# Patient Record
Sex: MALE | Race: WHITE | NOT HISPANIC OR LATINO | Employment: FULL TIME | URBAN - METROPOLITAN AREA
[De-identification: names, ages, dates, MRNs, and addresses within clinical notes are randomized per-mention and may not be internally consistent; named-entity substitution may affect disease eponyms.]

---

## 2023-01-24 PROBLEM — E03.9 HYPOTHYROIDISM: Status: ACTIVE | Noted: 2023-01-24

## 2023-01-24 PROBLEM — I10 BENIGN ESSENTIAL HYPERTENSION: Status: ACTIVE | Noted: 2023-01-24

## 2023-01-24 PROBLEM — E78.5 HYPERLIPIDEMIA: Status: ACTIVE | Noted: 2023-01-24

## 2023-01-24 PROBLEM — N18.30 CKD STAGE 3 SECONDARY TO DIABETES (MULTI): Status: ACTIVE | Noted: 2023-01-24

## 2023-01-24 PROBLEM — M10.9 GOUT: Status: ACTIVE | Noted: 2023-01-24

## 2023-01-24 PROBLEM — E11.9 DIABETES TYPE 2, CONTROLLED (MULTI): Status: ACTIVE | Noted: 2023-01-24

## 2023-01-24 PROBLEM — E11.22 CKD STAGE 3 SECONDARY TO DIABETES (MULTI): Status: ACTIVE | Noted: 2023-01-24

## 2023-01-24 RX ORDER — CHOLECALCIFEROL (VITAMIN D3) 25 MCG
1 TABLET ORAL EVERY OTHER DAY
COMMUNITY

## 2023-01-24 RX ORDER — LOSARTAN POTASSIUM 25 MG/1
1 TABLET ORAL DAILY
COMMUNITY
Start: 2020-03-23

## 2023-01-24 RX ORDER — FEBUXOSTAT 80 MG/1
1 TABLET, FILM COATED ORAL DAILY
COMMUNITY
End: 2023-03-08 | Stop reason: SDUPTHER

## 2023-01-24 RX ORDER — ASPIRIN 81 MG/1
1 TABLET ORAL DAILY
COMMUNITY

## 2023-01-24 RX ORDER — CHLORTHALIDONE 25 MG/1
1 TABLET ORAL DAILY
COMMUNITY
Start: 2021-11-29 | End: 2023-11-13 | Stop reason: ALTCHOICE

## 2023-01-24 RX ORDER — INSULIN LISPRO 100 [IU]/ML
INJECTION, SUSPENSION SUBCUTANEOUS 2 TIMES DAILY
COMMUNITY
Start: 2020-04-17

## 2023-01-24 RX ORDER — ROSUVASTATIN CALCIUM 20 MG/1
1 TABLET, COATED ORAL DAILY
COMMUNITY
Start: 2018-10-15 | End: 2024-04-24

## 2023-03-08 ENCOUNTER — OFFICE VISIT (OUTPATIENT)
Dept: PRIMARY CARE | Facility: CLINIC | Age: 63
End: 2023-03-08
Payer: COMMERCIAL

## 2023-03-08 VITALS
HEART RATE: 50 BPM | DIASTOLIC BLOOD PRESSURE: 78 MMHG | HEIGHT: 63 IN | BODY MASS INDEX: 28.53 KG/M2 | TEMPERATURE: 97.7 F | WEIGHT: 161 LBS | RESPIRATION RATE: 16 BRPM | SYSTOLIC BLOOD PRESSURE: 118 MMHG

## 2023-03-08 DIAGNOSIS — M10.9 GOUT OF MULTIPLE SITES, UNSPECIFIED CAUSE, UNSPECIFIED CHRONICITY: ICD-10-CM

## 2023-03-08 PROCEDURE — 4010F ACE/ARB THERAPY RXD/TAKEN: CPT | Performed by: FAMILY MEDICINE

## 2023-03-08 PROCEDURE — 99213 OFFICE O/P EST LOW 20 MIN: CPT | Performed by: FAMILY MEDICINE

## 2023-03-08 PROCEDURE — 3066F NEPHROPATHY DOC TX: CPT | Performed by: FAMILY MEDICINE

## 2023-03-08 PROCEDURE — 1036F TOBACCO NON-USER: CPT | Performed by: FAMILY MEDICINE

## 2023-03-08 PROCEDURE — 3044F HG A1C LEVEL LT 7.0%: CPT | Performed by: FAMILY MEDICINE

## 2023-03-08 PROCEDURE — 3078F DIAST BP <80 MM HG: CPT | Performed by: FAMILY MEDICINE

## 2023-03-08 PROCEDURE — 3074F SYST BP LT 130 MM HG: CPT | Performed by: FAMILY MEDICINE

## 2023-03-08 RX ORDER — FEBUXOSTAT 80 MG/1
80 TABLET, FILM COATED ORAL DAILY
Qty: 90 TABLET | Refills: 1 | Status: SHIPPED | OUTPATIENT
Start: 2023-03-08 | End: 2023-09-11 | Stop reason: SDUPTHER

## 2023-03-08 NOTE — PROGRESS NOTES
Subjective   Patient ID: Gurpreet Zimmer is a 62 y.o. male who presents for Gout.    HPI   Gout recheck -- Patient reports that gout has been stable. There have been no significant flares since last visit. Patient is tolerating medications well with no side effects. Patient denies joint redness, swelling, pain, or nodules.  He has not had a flare of gout in 4 years.  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  His most recent uric acid level was 2 months ago and it was 3.5.    Review of Systems    Objective   There were no vitals taken for this visit.    Physical Exam  General - Not in acute distress and cooperative.  Build & Nutrition - Well developed  Posture - Normal  Gait - Normal  Mental Status - alert and oriented x 3    Head - Normocephalic    Eyes - Bilateral - Sclera clear and lids pink without edema or mass.      Skin - Warm and dry with no rashes on visible skin    Neuropsychiatric - normal mood and affect, well groomed and good eye contact.  Able to articulate well with normal speech/language, rate and coherence.  Associations are intact.  No evidence of hallucinations, delusions, obsessions or homicidal/suicidal ideation.  Attention span and ability to concentrate are normal.    Hands-patient does have some tophi over some of the joints of his fingers.  None of them are tender and he says they have improved greatly over the last 5 years.  Assessment/Plan   Diagnoses and all orders for this visit:  Gout of multiple sites, unspecified cause, unspecified chronicity  -     febuxostat (Uloric) 80 mg tablet; Take 1 tablet (80 mg) by mouth once daily.   Patient's gout is very well controlled and his recent uric acid level was 3.5.  No change in current treatment regimen.  Refill given of current medication.  Make a follow up appointment with me for recheck in 6 months.

## 2023-06-28 LAB
ALANINE AMINOTRANSFERASE (SGPT) (U/L) IN SER/PLAS: 18 U/L (ref 10–52)
ALBUMIN (G/DL) IN SER/PLAS: 4.4 G/DL (ref 3.4–5)
ALKALINE PHOSPHATASE (U/L) IN SER/PLAS: 64 U/L (ref 33–136)
ANION GAP IN SER/PLAS: 12 MMOL/L (ref 10–20)
ASPARTATE AMINOTRANSFERASE (SGOT) (U/L) IN SER/PLAS: 20 U/L (ref 9–39)
BILIRUBIN TOTAL (MG/DL) IN SER/PLAS: 0.4 MG/DL (ref 0–1.2)
CALCIUM (MG/DL) IN SER/PLAS: 9.4 MG/DL (ref 8.6–10.3)
CARBON DIOXIDE, TOTAL (MMOL/L) IN SER/PLAS: 21 MMOL/L (ref 21–32)
CHLORIDE (MMOL/L) IN SER/PLAS: 114 MMOL/L (ref 98–107)
CREATININE (MG/DL) IN SER/PLAS: 2.32 MG/DL (ref 0.5–1.3)
ESTIMATED AVERAGE GLUCOSE FOR HBA1C: 148 MG/DL
GFR MALE: 31 ML/MIN/1.73M2
GLUCOSE (MG/DL) IN SER/PLAS: 194 MG/DL (ref 74–99)
HEMOGLOBIN A1C/HEMOGLOBIN TOTAL IN BLOOD: 6.8 %
PHOSPHATE (MG/DL) IN SER/PLAS: 4 MG/DL (ref 2.5–4.9)
POTASSIUM (MMOL/L) IN SER/PLAS: 5.2 MMOL/L (ref 3.5–5.3)
PROTEIN TOTAL: 7.1 G/DL (ref 6.4–8.2)
SODIUM (MMOL/L) IN SER/PLAS: 142 MMOL/L (ref 136–145)
UREA NITROGEN (MG/DL) IN SER/PLAS: 71 MG/DL (ref 6–23)

## 2023-09-11 ENCOUNTER — OFFICE VISIT (OUTPATIENT)
Dept: PRIMARY CARE | Facility: CLINIC | Age: 63
End: 2023-09-11
Payer: COMMERCIAL

## 2023-09-11 VITALS
WEIGHT: 158 LBS | HEIGHT: 63 IN | TEMPERATURE: 97 F | BODY MASS INDEX: 28 KG/M2 | HEART RATE: 48 BPM | RESPIRATION RATE: 16 BRPM | DIASTOLIC BLOOD PRESSURE: 72 MMHG | SYSTOLIC BLOOD PRESSURE: 142 MMHG

## 2023-09-11 DIAGNOSIS — M1A.09X0 CHRONIC GOUT OF MULTIPLE SITES, UNSPECIFIED CAUSE: Primary | ICD-10-CM

## 2023-09-11 PROCEDURE — 3066F NEPHROPATHY DOC TX: CPT | Performed by: FAMILY MEDICINE

## 2023-09-11 PROCEDURE — 3078F DIAST BP <80 MM HG: CPT | Performed by: FAMILY MEDICINE

## 2023-09-11 PROCEDURE — 3044F HG A1C LEVEL LT 7.0%: CPT | Performed by: FAMILY MEDICINE

## 2023-09-11 PROCEDURE — 99213 OFFICE O/P EST LOW 20 MIN: CPT | Performed by: FAMILY MEDICINE

## 2023-09-11 PROCEDURE — 1036F TOBACCO NON-USER: CPT | Performed by: FAMILY MEDICINE

## 2023-09-11 PROCEDURE — 3077F SYST BP >= 140 MM HG: CPT | Performed by: FAMILY MEDICINE

## 2023-09-11 PROCEDURE — 90471 IMMUNIZATION ADMIN: CPT | Performed by: FAMILY MEDICINE

## 2023-09-11 PROCEDURE — 90686 IIV4 VACC NO PRSV 0.5 ML IM: CPT | Performed by: FAMILY MEDICINE

## 2023-09-11 PROCEDURE — 4010F ACE/ARB THERAPY RXD/TAKEN: CPT | Performed by: FAMILY MEDICINE

## 2023-09-11 RX ORDER — DAPAGLIFLOZIN 5 MG/1
TABLET, FILM COATED ORAL
COMMUNITY
End: 2023-11-13 | Stop reason: ALTCHOICE

## 2023-09-11 RX ORDER — FEBUXOSTAT 80 MG/1
80 TABLET, FILM COATED ORAL DAILY
Qty: 90 TABLET | Refills: 1 | Status: SHIPPED | OUTPATIENT
Start: 2023-09-11 | End: 2024-03-14 | Stop reason: SDUPTHER

## 2023-09-11 ASSESSMENT — ENCOUNTER SYMPTOMS
OCCASIONAL FEELINGS OF UNSTEADINESS: 0
DEPRESSION: 0
LOSS OF SENSATION IN FEET: 0

## 2023-09-11 NOTE — PROGRESS NOTES
"Gurpreet Zimmer is a 62 y.o. male here today for   Chief Complaint   Patient presents with    Gout   Pt sees Lukas for DM      HPI   Gout recheck -- Patient reports that gout has been stable. There have been no significant flares since last visit. Patient is tolerating medications well with no side effects. Patient denies joint redness, swelling, pain, or nodules.   Minor gout pain in left first toe just this AM.  No redness - just very minor pain.      DM2 - well controlled with endocrinologist.  [place]      Would like a flu shot.          Current Outpatient Medications:     aspirin 81 mg EC tablet, Take 1 tablet (81 mg) by mouth once daily., Disp: , Rfl:     chlorthalidone (Hygroton) 25 mg tablet, Take 1 tablet (25 mg) by mouth once daily., Disp: , Rfl:     cholecalciferol (Vitamin D-3) 25 MCG (1000 UT) tablet, Take 1 tablet (25 mcg) by mouth every other day., Disp: , Rfl:     dapagliflozin propanediol (Farxiga) 5 mg, Take by mouth. Pt unsure of dose, Disp: , Rfl:     insulin lispro protamin-lispro (HumaLOG Mix 75-25) 100 unit/mL (75-25) injection, Take as directed per insulin instructions. Inject 6-8 units twice a day, Disp: , Rfl:     losartan (Cozaar) 25 mg tablet, Take 1 tablet (25 mg) by mouth once daily., Disp: , Rfl:     rosuvastatin (Crestor) 20 mg tablet, Take 1 tablet (20 mg) by mouth once daily., Disp: , Rfl:     febuxostat (Uloric) 80 mg tablet, Take 1 tablet (80 mg) by mouth once daily., Disp: 90 tablet, Rfl: 1    Patient Active Problem List   Diagnosis    Benign essential hypertension    CKD stage 3 secondary to diabetes (CMS/[facility])    Diabetes type 2, controlled (CMS/[facility])    Gout    Hyperlipidemia    Hypothyroidism         No results found for this or any previous visit (from the past 672 hour(s)).     Objective    Visit Vitals  /72   Pulse (!) 48   Temp 36.1 °C (97 °F)   Resp 16   Ht 1.6 m (5' 3\")   Wt 71.7 kg (158 lb)   BMI 27.99 kg/m²     Body mass index is 27.99 kg/m². "     Physical Exam   General - Not in acute distress and cooperative.  Build & Nutrition - Well developed  Posture - Normal  Gait - Normal  Mental Status - alert and oriented x 3    Head - Normocephalic    Neck - Thyroid normal size    Eyes - Bilateral - Sclera clear and lids pink without edema or mass.      Skin - Warm and dry with no rashes on visible skin    Lungs - Clear to auscultation and normal breathing effort    Cardiovascular - RRR and no murmurs, rubs or thrill.    Peripheral Vascular - Bilateral - no edema present    Neuropsychiatric - normal mood and affect    Right foot-there is very mild erythema and swelling of the right first MP joint today.  There is no tenderness.    Assessment    1. Chronic gout of multiple sites, unspecified cause  Uric Acid, febuxostat (Uloric) 80 mg tablet   Condition well controlled.  No change in current treatment regimen.  Refill given of current medication.  Appropriate labs ordered or reviewed.  Make a follow up appointment with me for recheck in 6 months.  He has a very minor current flare as above.  This is his first symptoms in over 3 years.  He plans to take Aleve twice daily for the next 1 to 2 days because this usually works very well for gout for him.  If the gout is not improving he can call us for further advice.

## 2023-11-13 ENCOUNTER — OFFICE VISIT (OUTPATIENT)
Dept: NEPHROLOGY | Facility: CLINIC | Age: 63
End: 2023-11-13
Payer: COMMERCIAL

## 2023-11-13 ENCOUNTER — OFFICE VISIT (OUTPATIENT)
Dept: ENDOCRINOLOGY | Facility: CLINIC | Age: 63
End: 2023-11-13
Payer: COMMERCIAL

## 2023-11-13 VITALS
HEIGHT: 63 IN | DIASTOLIC BLOOD PRESSURE: 62 MMHG | HEART RATE: 56 BPM | WEIGHT: 162 LBS | BODY MASS INDEX: 28.7 KG/M2 | SYSTOLIC BLOOD PRESSURE: 131 MMHG

## 2023-11-13 VITALS
HEIGHT: 63 IN | BODY MASS INDEX: 28.67 KG/M2 | HEART RATE: 72 BPM | RESPIRATION RATE: 16 BRPM | WEIGHT: 161.8 LBS | DIASTOLIC BLOOD PRESSURE: 70 MMHG | SYSTOLIC BLOOD PRESSURE: 122 MMHG

## 2023-11-13 DIAGNOSIS — E78.5 HYPERLIPIDEMIA, UNSPECIFIED HYPERLIPIDEMIA TYPE: ICD-10-CM

## 2023-11-13 DIAGNOSIS — E11.22 CONTROLLED TYPE 2 DIABETES MELLITUS WITH STAGE 3 CHRONIC KIDNEY DISEASE, WITHOUT LONG-TERM CURRENT USE OF INSULIN (MULTI): ICD-10-CM

## 2023-11-13 DIAGNOSIS — Z79.4 CONTROLLED TYPE 2 DIABETES MELLITUS WITHOUT COMPLICATION, WITH LONG-TERM CURRENT USE OF INSULIN (MULTI): Primary | ICD-10-CM

## 2023-11-13 DIAGNOSIS — E11.22 CKD STAGE 3 SECONDARY TO DIABETES (MULTI): Primary | ICD-10-CM

## 2023-11-13 DIAGNOSIS — E11.9 CONTROLLED TYPE 2 DIABETES MELLITUS WITHOUT COMPLICATION, WITH LONG-TERM CURRENT USE OF INSULIN (MULTI): Primary | ICD-10-CM

## 2023-11-13 DIAGNOSIS — N18.30 CKD STAGE 3 SECONDARY TO DIABETES (MULTI): Primary | ICD-10-CM

## 2023-11-13 DIAGNOSIS — I10 BENIGN ESSENTIAL HYPERTENSION: ICD-10-CM

## 2023-11-13 DIAGNOSIS — N18.30 CONTROLLED TYPE 2 DIABETES MELLITUS WITH STAGE 3 CHRONIC KIDNEY DISEASE, WITHOUT LONG-TERM CURRENT USE OF INSULIN (MULTI): ICD-10-CM

## 2023-11-13 PROCEDURE — 3074F SYST BP LT 130 MM HG: CPT | Performed by: INTERNAL MEDICINE

## 2023-11-13 PROCEDURE — 4010F ACE/ARB THERAPY RXD/TAKEN: CPT | Performed by: INTERNAL MEDICINE

## 2023-11-13 PROCEDURE — 99213 OFFICE O/P EST LOW 20 MIN: CPT | Performed by: INTERNAL MEDICINE

## 2023-11-13 PROCEDURE — 3066F NEPHROPATHY DOC TX: CPT | Performed by: INTERNAL MEDICINE

## 2023-11-13 PROCEDURE — 99214 OFFICE O/P EST MOD 30 MIN: CPT | Performed by: INTERNAL MEDICINE

## 2023-11-13 PROCEDURE — 3078F DIAST BP <80 MM HG: CPT | Performed by: INTERNAL MEDICINE

## 2023-11-13 PROCEDURE — 3044F HG A1C LEVEL LT 7.0%: CPT | Performed by: INTERNAL MEDICINE

## 2023-11-13 PROCEDURE — 1036F TOBACCO NON-USER: CPT | Performed by: INTERNAL MEDICINE

## 2023-11-13 PROCEDURE — 3075F SYST BP GE 130 - 139MM HG: CPT | Performed by: INTERNAL MEDICINE

## 2023-11-13 ASSESSMENT — ENCOUNTER SYMPTOMS
DIZZINESS: 0
FEVER: 0
DIARRHEA: 0
SHORTNESS OF BREATH: 0
NAUSEA: 0
VOMITING: 0
CHILLS: 0
LIGHT-HEADEDNESS: 0

## 2023-11-13 ASSESSMENT — PAIN SCALES - GENERAL: PAINLEVEL: 0-NO PAIN

## 2023-11-13 NOTE — PROGRESS NOTES
"Subjective   Patient ID: Gurpreet Zimmer is a 63 y.o. male who presents for Diabetes (Type 2) and Hyperlipidemia.  HPI  Since last visit started farxgia after discussing it with nephro.  Doing fine on it. Reports sugars are good. Rare lows.  Still has not scheduled eye exam, forgot.  No complaints but would like to lose a little weight.  Knows he needs to work on exercise    Review of Systems   Constitutional:  Negative for chills and fever.   Respiratory:  Negative for shortness of breath.    Gastrointestinal:  Negative for diarrhea, nausea and vomiting.   Endocrine: Negative for cold intolerance and heat intolerance.   Neurological:  Negative for dizziness and light-headedness.       Objective       11/13/2023 8:32 AM    /70   Pulse 72   Resp 16   Weight 73.4 kg (161 lb 12.8 oz)   Height 1.6 m (5' 3\")       Physical Exam  Constitutional:       Appearance: Normal appearance. He is overweight.   HENT:      Head: Normocephalic and atraumatic.   Neck:      Thyroid: No thyroid mass, thyromegaly or thyroid tenderness.   Cardiovascular:      Rate and Rhythm: Normal rate and regular rhythm.      Heart sounds: No murmur heard.     No gallop.   Pulmonary:      Effort: Pulmonary effort is normal.      Breath sounds: Normal breath sounds.   Abdominal:      Palpations: Abdomen is soft.      Comments: benign   Neurological:      General: No focal deficit present.      Mental Status: He is alert and oriented to person, place, and time.      Deep Tendon Reflexes: Reflexes are normal and symmetric.   Psychiatric:         Behavior: Behavior is cooperative.         Assessment/Plan   Problem List Items Addressed This Visit             ICD-10-CM    Benign essential hypertension I10    Diabetes type 2, controlled (CMS/HCC) - Primary E11.9    Relevant Orders    Comprehensive Metabolic Panel    CBC    Lipid Panel    Hemoglobin A1C    Albumin , Urine Random    Hyperlipidemia E78.5     DM2:  Blood work fasting in near future  Schedule " eye appt  Htn:  Bp excellent  Lipids:  Due for recheck  Follow up in 6 months

## 2023-11-13 NOTE — PROGRESS NOTES
[facility] Progress Note    Subjective   Today [facility] had a [facility]. He is a pleasant 63-year-old male who presents for f/u of his chronic kidney disease follow-up. He was last seen in our clinic 6 months ago in May 2023.     Past medical history: Uncontrolled hypertension, chronic kidney disease stage III, diabetes type 2, gout  His father was on dialysis for few years before he passed away. He still works as a      From Nov 2022  - no acute complaints. BP an HTN well controlled. Staying active. Occasional left leg swelling but normal for him. Trying to eat better.     From May 2023:  No acute complaints. BP and HTN remain well controlled. Staying healthy.     Today's visit:  No acute complaints. Began farxiga after last visit, tolerating it well. BP and blood glucose under control.     Review of Systems     Constitutional: no fever,~no chills,~no recent weight gain,~no recent weight loss~and~not feeling tired.   Cardiovascular: no chest pain~and~no lower extremity edema.   Respiratory: shortness of breath during exertion, but~no shortness of breath,~no orthopnea~and~no Postural Nocturnal Dyspnea.   Gastrointestinal: constipation, but~no abdominal pain,~no vomiting,~no diarrhea~and~no nausea. bloated   Genitourinary: no dysuria~and~no urinary frequency.   Neurological: no headaches.   Psychiatric: sleep disturbances.     Objective          Vitals 24HR  Vitals:    11/13/23 1245   BP: 131/62   Pulse: 56            Physical Exam  Constitutional:       General: He is not in acute distress.  Cardiovascular:      Rate and Rhythm: Regular rhythm.   Pulmonary:      Effort: No respiratory distress.      Breath sounds: Normal breath sounds.   Abdominal:      Palpations: Abdomen is soft.      Tenderness: There is no abdominal tenderness.   Musculoskeletal:      Left lower leg: No edema (trace leg leg swelling).   Neurological:       Mental Status: He is oriented to person, place, and time.   Psychiatric:         Mood and Affect: Mood normal.           Current Outpatient Medications:     aspirin 81 mg EC tablet, Take 1 tablet (81 mg) by mouth once daily., Disp: , Rfl:     cholecalciferol (Vitamin D-3) 25 MCG (1000 UT) tablet, Take 1 tablet (25 mcg) by mouth every other day., Disp: , Rfl:     dapagliflozin propanediol (Farxiga) 10 mg, TAKE 1 TABLET BY MOUTH EVERY MORNING, Disp: 30 tablet, Rfl: 5    febuxostat (Uloric) 80 mg tablet, Take 1 tablet (80 mg) by mouth once daily., Disp: 90 tablet, Rfl: 1    insulin lispro protamin-lispro (HumaLOG Mix 75-25) 100 unit/mL (75-25) injection, Take as directed per insulin instructions. Inject 6-8 units twice a day, Disp: , Rfl:     losartan (Cozaar) 25 mg tablet, Take 1 tablet (25 mg) by mouth once daily., Disp: , Rfl:     rosuvastatin (Crestor) 20 mg tablet, Take 1 tablet (20 mg) by mouth once daily., Disp: , Rfl:      Assessment/Plan   Problem List Items Addressed This Visit          Cardiac and Vasculature    Benign essential hypertension       Endocrine/Metabolic    Diabetes type 2, controlled (CMS/[facility])       Genitourinary and Reproductive    CKD stage 3 secondary to diabetes (CMS/[facility]) - Primary    Overview     Last impression 03/07/2022 This has been stable and managed by his nephrologist per Rigoberto Mike                1. Chronic kidney disease stage G3A2 likely secondary to longstanding history of diabetes type 2 and hypertension   - labs from June 2023 show RFP stable with Cr 2.3, GFR 31, K 5.2, HCO3 21  - continue farxiga 10mg - tolerating well since starting after May visit  - avoid high K diet  - will check BMP and urine albumin/creatinine ratio     2. Hypertension - BP stable, does not check at home; continue losartan   3. DM - HbA1C<7  4. Gout - stable - on febuxostat, has not had attack in 10 years    RTC 6 months    D/w Dr. Annalee Gerard MD, MD  Nephrology Fellow PGY 5  Contact  via secure chat  Nephrology Pager # [phone] ([phone])

## 2023-12-05 ENCOUNTER — LAB (OUTPATIENT)
Dept: LAB | Facility: LAB | Age: 63
End: 2023-12-05
Payer: COMMERCIAL

## 2023-12-05 DIAGNOSIS — E11.9 CONTROLLED TYPE 2 DIABETES MELLITUS WITHOUT COMPLICATION, WITH LONG-TERM CURRENT USE OF INSULIN (MULTI): ICD-10-CM

## 2023-12-05 DIAGNOSIS — M1A.09X0 CHRONIC GOUT OF MULTIPLE SITES, UNSPECIFIED CAUSE: ICD-10-CM

## 2023-12-05 DIAGNOSIS — Z79.4 CONTROLLED TYPE 2 DIABETES MELLITUS WITHOUT COMPLICATION, WITH LONG-TERM CURRENT USE OF INSULIN (MULTI): ICD-10-CM

## 2023-12-05 DIAGNOSIS — N18.30 CKD STAGE 3 SECONDARY TO DIABETES (MULTI): ICD-10-CM

## 2023-12-05 DIAGNOSIS — E11.22 CKD STAGE 3 SECONDARY TO DIABETES (MULTI): Primary | ICD-10-CM

## 2023-12-05 DIAGNOSIS — E11.22 CKD STAGE 3 SECONDARY TO DIABETES (MULTI): ICD-10-CM

## 2023-12-05 DIAGNOSIS — N18.30 CKD STAGE 3 SECONDARY TO DIABETES (MULTI): Primary | ICD-10-CM

## 2023-12-05 LAB
ALBUMIN SERPL BCP-MCNC: 4.4 G/DL (ref 3.4–5)
ALP SERPL-CCNC: 65 U/L (ref 33–136)
ALT SERPL W P-5'-P-CCNC: 13 U/L (ref 10–52)
ANION GAP SERPL CALC-SCNC: 16 MMOL/L (ref 10–20)
AST SERPL W P-5'-P-CCNC: 18 U/L (ref 9–39)
BILIRUB SERPL-MCNC: 0.4 MG/DL (ref 0–1.2)
BUN SERPL-MCNC: 75 MG/DL (ref 6–23)
CALCIUM SERPL-MCNC: 9.5 MG/DL (ref 8.6–10.3)
CHLORIDE SERPL-SCNC: 111 MMOL/L (ref 98–107)
CHOLEST SERPL-MCNC: 165 MG/DL (ref 0–199)
CHOLESTEROL/HDL RATIO: 4
CO2 SERPL-SCNC: 19 MMOL/L (ref 21–32)
CREAT SERPL-MCNC: 2.59 MG/DL (ref 0.5–1.3)
CREAT UR-MCNC: 60.5 MG/DL (ref 20–370)
ERYTHROCYTE [DISTWIDTH] IN BLOOD BY AUTOMATED COUNT: 13.2 % (ref 11.5–14.5)
EST. AVERAGE GLUCOSE BLD GHB EST-MCNC: 140 MG/DL
GFR SERPL CREATININE-BSD FRML MDRD: 27 ML/MIN/1.73M*2
GLUCOSE SERPL-MCNC: 91 MG/DL (ref 74–99)
HBA1C MFR BLD: 6.5 %
HCT VFR BLD AUTO: 43.2 % (ref 41–52)
HDLC SERPL-MCNC: 41.5 MG/DL
HGB BLD-MCNC: 13.2 G/DL (ref 13.5–17.5)
LDLC SERPL CALC-MCNC: 86 MG/DL
MCH RBC QN AUTO: 28 PG (ref 26–34)
MCHC RBC AUTO-ENTMCNC: 30.6 G/DL (ref 32–36)
MCV RBC AUTO: 92 FL (ref 80–100)
MICROALBUMIN UR-MCNC: 28.1 MG/L
MICROALBUMIN/CREAT UR: 46.4 UG/MG CREAT
NON HDL CHOLESTEROL: 124 MG/DL (ref 0–149)
NRBC BLD-RTO: 0 /100 WBCS (ref 0–0)
PLATELET # BLD AUTO: 262 X10*3/UL (ref 150–450)
POTASSIUM SERPL-SCNC: 4.9 MMOL/L (ref 3.5–5.3)
PROT SERPL-MCNC: 7 G/DL (ref 6.4–8.2)
RBC # BLD AUTO: 4.71 X10*6/UL (ref 4.5–5.9)
SODIUM SERPL-SCNC: 141 MMOL/L (ref 136–145)
TRIGL SERPL-MCNC: 188 MG/DL (ref 0–149)
URATE SERPL-MCNC: 3.7 MG/DL (ref 4–7.5)
VLDL: 38 MG/DL (ref 0–40)
WBC # BLD AUTO: 7.8 X10*3/UL (ref 4.4–11.3)

## 2023-12-05 PROCEDURE — 36415 COLL VENOUS BLD VENIPUNCTURE: CPT

## 2023-12-05 PROCEDURE — 84550 ASSAY OF BLOOD/URIC ACID: CPT

## 2023-12-05 PROCEDURE — 80053 COMPREHEN METABOLIC PANEL: CPT

## 2023-12-05 PROCEDURE — 83036 HEMOGLOBIN GLYCOSYLATED A1C: CPT

## 2023-12-05 PROCEDURE — 80061 LIPID PANEL: CPT

## 2023-12-05 PROCEDURE — 85027 COMPLETE CBC AUTOMATED: CPT

## 2023-12-05 PROCEDURE — 82043 UR ALBUMIN QUANTITATIVE: CPT

## 2023-12-05 PROCEDURE — 82570 ASSAY OF URINE CREATININE: CPT

## 2023-12-05 RX ORDER — DAPAGLIFLOZIN 10 MG/1
10 TABLET, FILM COATED ORAL
Qty: 90 TABLET | Refills: 3 | Status: SHIPPED | OUTPATIENT
Start: 2023-12-05 | End: 2024-12-04

## 2023-12-05 NOTE — RESULT ENCOUNTER NOTE
Please inform patient his uric acid level is very good at 3.7.  No change in current treatment regimen.

## 2023-12-21 PROCEDURE — RXMED WILLOW AMBULATORY MEDICATION CHARGE

## 2023-12-22 ENCOUNTER — PHARMACY VISIT (OUTPATIENT)
Dept: PHARMACY | Facility: CLINIC | Age: 63
End: 2023-12-22
Payer: COMMERCIAL

## 2024-01-30 PROCEDURE — RXMED WILLOW AMBULATORY MEDICATION CHARGE

## 2024-01-31 ENCOUNTER — PHARMACY VISIT (OUTPATIENT)
Dept: PHARMACY | Facility: CLINIC | Age: 64
End: 2024-01-31
Payer: COMMERCIAL

## 2024-03-07 PROCEDURE — RXMED WILLOW AMBULATORY MEDICATION CHARGE

## 2024-03-08 ENCOUNTER — PHARMACY VISIT (OUTPATIENT)
Dept: PHARMACY | Facility: CLINIC | Age: 64
End: 2024-03-08
Payer: COMMERCIAL

## 2024-03-12 ENCOUNTER — APPOINTMENT (OUTPATIENT)
Dept: PRIMARY CARE | Facility: CLINIC | Age: 64
End: 2024-03-12
Payer: COMMERCIAL

## 2024-03-14 ENCOUNTER — OFFICE VISIT (OUTPATIENT)
Dept: PRIMARY CARE | Facility: CLINIC | Age: 64
End: 2024-03-14
Payer: COMMERCIAL

## 2024-03-14 VITALS
TEMPERATURE: 97 F | HEART RATE: 56 BPM | DIASTOLIC BLOOD PRESSURE: 64 MMHG | RESPIRATION RATE: 16 BRPM | BODY MASS INDEX: 28.17 KG/M2 | SYSTOLIC BLOOD PRESSURE: 118 MMHG | HEIGHT: 63 IN | WEIGHT: 159 LBS

## 2024-03-14 DIAGNOSIS — Z12.11 SCREENING FOR MALIGNANT NEOPLASM OF COLON: Primary | ICD-10-CM

## 2024-03-14 DIAGNOSIS — M1A.09X1 IDIOPATHIC CHRONIC GOUT OF MULTIPLE SITES WITH TOPHUS: ICD-10-CM

## 2024-03-14 PROBLEM — E78.2 MIXED HYPERLIPIDEMIA: Status: ACTIVE | Noted: 2023-01-24

## 2024-03-14 PROCEDURE — 3078F DIAST BP <80 MM HG: CPT | Performed by: FAMILY MEDICINE

## 2024-03-14 PROCEDURE — 1036F TOBACCO NON-USER: CPT | Performed by: FAMILY MEDICINE

## 2024-03-14 PROCEDURE — 4010F ACE/ARB THERAPY RXD/TAKEN: CPT | Performed by: FAMILY MEDICINE

## 2024-03-14 PROCEDURE — 3074F SYST BP LT 130 MM HG: CPT | Performed by: FAMILY MEDICINE

## 2024-03-14 PROCEDURE — 99213 OFFICE O/P EST LOW 20 MIN: CPT | Performed by: FAMILY MEDICINE

## 2024-03-14 RX ORDER — FEBUXOSTAT 80 MG/1
80 TABLET, FILM COATED ORAL DAILY
Qty: 90 TABLET | Refills: 1 | Status: SHIPPED | OUTPATIENT
Start: 2024-03-14

## 2024-03-14 NOTE — PROGRESS NOTES
Gurpreet Zimmer is a 63 y.o. male here today for   Chief Complaint   Patient presents with    Gout        HPI     Gout recheck -- Patient reports that gout has been stable. There have been no significant flares since last visit. Patient is tolerating medications well with no side effects. Patient denies joint redness, swelling, pain, or nodules.  No flares for years.  Used to have some tophi but they resolved with Uloric.  Still with a few of hands.  His most recent uric acid level was 3.7 about 3 months ago.    Sees endocrinologist for other medical issues.          Current Outpatient Medications:     aspirin 81 mg EC tablet, Take 1 tablet (81 mg) by mouth once daily., Disp: , Rfl:     cholecalciferol (Vitamin D-3) 25 MCG (1000 UT) tablet, Take 1 tablet (25 mcg) by mouth every other day., Disp: , Rfl:     dapagliflozin propanediol (Farxiga) 10 mg, TAKE 1 TABLET BY MOUTH EVERY MORNING, Disp: 90 tablet, Rfl: 3    insulin lispro protamin-lispro (HumaLOG Mix 75-25) 100 unit/mL (75-25) injection, Take as directed per insulin instructions. Inject 6-8 units twice a day, Disp: , Rfl:     losartan (Cozaar) 25 mg tablet, Take 1 tablet (25 mg) by mouth once daily., Disp: , Rfl:     rosuvastatin (Crestor) 20 mg tablet, Take 1 tablet (20 mg) by mouth once daily., Disp: , Rfl:     febuxostat (Uloric) 80 mg tablet, Take 1 tablet (80 mg) by mouth once daily., Disp: 90 tablet, Rfl: 1    Patient Active Problem List   Diagnosis    Benign essential hypertension    CKD stage 3 secondary to diabetes (CMS/[facility])    Diabetes type 2, controlled (CMS/[facility])    Idiopathic chronic gout of multiple sites with tophus    Mixed hyperlipidemia    Hypothyroidism    Partial hypopituitarism (CMS/[facility])    Osteoporosis    Familial combined hyperlipidemia    Chondromalacia of patella         No results found for this or any previous visit (from the past 672 hour(s)).     Objective    Visit Vitals    Visit Vitals  /64   Pulse 56   Temp 36.1 °C (97 °F)  "  Resp 16   Ht 1.6 m (5' 3\")   Wt 72.1 kg (159 lb)   BMI 28.17 kg/m²   Smoking Status Never   BSA 1.79 m²       Body mass index is 28.17 kg/m².     Physical Exam   Hands-patient does have a few small tophi of his hands but they have greatly decreased in size over time and there is no tenderness.    Assessment    1. Screening for malignant neoplasm of colon  Cologuard® colon cancer screening, CANCELED: Cologuard® colon cancer screening   After discussion patient would prefer Cologuard for screening so this was ordered.     2. Idiopathic chronic gout of multiple sites with tophus  febuxostat (Uloric) 80 mg tablet   Condition well controlled.  No change in current treatment regimen.  Refill given of current medication.  Make a follow up appointment with me for recheck in 6 months.             Orders Placed This Encounter      febuxostat (Uloric) 80 mg tablet       Orders Placed This Encounter   Procedures    Cologuard® colon cancer screening        New Medications Ordered This Visit   Medications    febuxostat (Uloric) 80 mg tablet     Sig: Take 1 tablet (80 mg) by mouth once daily.     Dispense:  90 tablet     Refill:  1          "

## 2024-03-20 ENCOUNTER — TELEPHONE (OUTPATIENT)
Dept: PRIMARY CARE | Facility: CLINIC | Age: 64
End: 2024-03-20
Payer: COMMERCIAL

## 2024-04-08 PROCEDURE — RXMED WILLOW AMBULATORY MEDICATION CHARGE

## 2024-04-10 ENCOUNTER — PHARMACY VISIT (OUTPATIENT)
Dept: PHARMACY | Facility: CLINIC | Age: 64
End: 2024-04-10
Payer: COMMERCIAL

## 2024-04-24 DIAGNOSIS — E78.5 HYPERLIPIDEMIA, UNSPECIFIED HYPERLIPIDEMIA TYPE: Primary | ICD-10-CM

## 2024-04-24 RX ORDER — ROSUVASTATIN CALCIUM 20 MG/1
20 TABLET, COATED ORAL DAILY
Qty: 90 TABLET | Refills: 3 | Status: SHIPPED | OUTPATIENT
Start: 2024-04-24

## 2024-05-10 PROCEDURE — RXMED WILLOW AMBULATORY MEDICATION CHARGE

## 2024-05-13 ENCOUNTER — OFFICE VISIT (OUTPATIENT)
Dept: ENDOCRINOLOGY | Facility: CLINIC | Age: 64
End: 2024-05-13
Payer: COMMERCIAL

## 2024-05-13 VITALS
SYSTOLIC BLOOD PRESSURE: 110 MMHG | RESPIRATION RATE: 16 BRPM | WEIGHT: 155.4 LBS | HEART RATE: 57 BPM | DIASTOLIC BLOOD PRESSURE: 66 MMHG | BODY MASS INDEX: 27.54 KG/M2 | HEIGHT: 63 IN

## 2024-05-13 DIAGNOSIS — I10 BENIGN ESSENTIAL HYPERTENSION: ICD-10-CM

## 2024-05-13 DIAGNOSIS — E78.5 HYPERLIPIDEMIA, UNSPECIFIED HYPERLIPIDEMIA TYPE: ICD-10-CM

## 2024-05-13 DIAGNOSIS — E11.9 CONTROLLED TYPE 2 DIABETES MELLITUS WITHOUT COMPLICATION, WITH LONG-TERM CURRENT USE OF INSULIN (MULTI): Primary | ICD-10-CM

## 2024-05-13 DIAGNOSIS — Z79.4 CONTROLLED TYPE 2 DIABETES MELLITUS WITHOUT COMPLICATION, WITH LONG-TERM CURRENT USE OF INSULIN (MULTI): Primary | ICD-10-CM

## 2024-05-13 PROCEDURE — 3074F SYST BP LT 130 MM HG: CPT | Performed by: INTERNAL MEDICINE

## 2024-05-13 PROCEDURE — 99214 OFFICE O/P EST MOD 30 MIN: CPT | Performed by: INTERNAL MEDICINE

## 2024-05-13 PROCEDURE — 1036F TOBACCO NON-USER: CPT | Performed by: INTERNAL MEDICINE

## 2024-05-13 PROCEDURE — 4010F ACE/ARB THERAPY RXD/TAKEN: CPT | Performed by: INTERNAL MEDICINE

## 2024-05-13 PROCEDURE — 3078F DIAST BP <80 MM HG: CPT | Performed by: INTERNAL MEDICINE

## 2024-05-13 ASSESSMENT — ENCOUNTER SYMPTOMS
FEVER: 0
NAUSEA: 0
DIARRHEA: 0
CHILLS: 0
DIZZINESS: 0
SHORTNESS OF BREATH: 0
VOMITING: 0
LIGHT-HEADEDNESS: 0

## 2024-05-13 NOTE — PROGRESS NOTES
Endocrinology: Follow up visit  Subjective   Patient ID: Gurpreet Zimmer is a 63 y.o. male who presents for Diabetes (Type 2 ) and Hyperlipidemia.    PCP: Rigoberto Mike MD    HPI  Since last visit doing great with insulin and farxiga.  Sugars excellent.  Some lower numbers in am fairly often.  No overt lows.  Feeling fine.   Forgot to schedule eye exam again.      Review of Systems   Constitutional:  Negative for chills and fever.   Respiratory:  Negative for shortness of breath.    Gastrointestinal:  Negative for diarrhea, nausea and vomiting.   Endocrine: Negative for cold intolerance and heat intolerance.   Neurological:  Negative for dizziness and light-headedness.       Patient Active Problem List   Diagnosis    Benign essential hypertension    CKD stage 3 secondary to diabetes ([facility])    Diabetes type 2, controlled ([facility])    Idiopathic chronic gout of multiple sites with tophus    Mixed hyperlipidemia    Hypothyroidism    Partial hypopituitarism (Multi)    Osteoporosis    Familial combined hyperlipidemia    Chondromalacia of patella        Home Meds:  Current Outpatient Medications   Medication Instructions    aspirin 81 mg EC tablet 1 tablet, oral, Daily    cholecalciferol (Vitamin D-3) 25 MCG (1000 UT) tablet 1 tablet, oral, Every other day    dapagliflozin propanediol (Farxiga) 10 mg TAKE 1 TABLET BY MOUTH EVERY MORNING    febuxostat (ULORIC) 80 mg, oral, Daily    insulin lispro protamin-lispro (HumaLOG Mix 75-25) 100 unit/mL (75-25) injection subcutaneous, 2 times daily, Inject 6-8 units twice a day    losartan (Cozaar) 25 mg tablet 1 tablet, oral, Daily    rosuvastatin (CRESTOR) 20 mg, oral, Daily        Allergies   Allergen Reactions    Allopurinol Anaphylaxis and Angioedema        Objective   Vitals:    05/13/24 0835   BP: 110/66   Pulse: 57   Resp: 16      Vitals:    05/13/24 0835   Weight: 70.5 kg (155 lb 6.4 oz)      Body mass index is 27.53 kg/m².   Physical Exam  Constitutional:       Appearance:  "Normal appearance. He is overweight.   HENT:      Head: Normocephalic and atraumatic.   Neck:      Thyroid: No thyroid mass, thyromegaly or thyroid tenderness.   Cardiovascular:      Rate and Rhythm: Normal rate and regular rhythm.      Heart sounds: No murmur heard.     No gallop.   Pulmonary:      Effort: Pulmonary effort is normal.      Breath sounds: Normal breath sounds.   Abdominal:      Palpations: Abdomen is soft.      Comments: benign   Neurological:      General: No focal deficit present.      Mental Status: He is alert and oriented to person, place, and time.      Deep Tendon Reflexes: Reflexes are normal and symmetric.   Psychiatric:         Behavior: Behavior is cooperative.         Labs:  Lab Results   Component Value Date    HGBA1C 6.5 (H) 12/05/2023    TSH 1.47 05/26/2021      No results found for: \"PR1\", \"THYROIDPAB\", \"TSI\"     Assessment/Plan   Problem List Items Addressed This Visit       Benign essential hypertension    Diabetes type 2, controlled ([facility]) - Primary    Relevant Orders    Comprehensive Metabolic Panel    Hemoglobin A1C     Other Visit Diagnoses       Hyperlipidemia, unspecified hyperlipidemia type              Dm2:  Sugars discussed  Cut back pm insulin dose to 4 units  Htn:  Bp excellent  Lipids;  Controlled last check  Follow up in 6 months    Electronically signed by:  Juanis Reece MD 05/13/24 9:17 AM              "

## 2024-05-14 ENCOUNTER — PHARMACY VISIT (OUTPATIENT)
Dept: PHARMACY | Facility: CLINIC | Age: 64
End: 2024-05-14
Payer: COMMERCIAL

## 2024-06-06 PROCEDURE — RXMED WILLOW AMBULATORY MEDICATION CHARGE

## 2024-06-07 ENCOUNTER — PHARMACY VISIT (OUTPATIENT)
Dept: PHARMACY | Facility: CLINIC | Age: 64
End: 2024-06-07
Payer: COMMERCIAL

## 2024-06-21 ENCOUNTER — APPOINTMENT (OUTPATIENT)
Dept: NEPHROLOGY | Facility: CLINIC | Age: 64
End: 2024-06-21
Payer: COMMERCIAL

## 2024-06-21 VITALS
WEIGHT: 154 LBS | HEART RATE: 60 BPM | HEIGHT: 63 IN | DIASTOLIC BLOOD PRESSURE: 76 MMHG | BODY MASS INDEX: 27.29 KG/M2 | SYSTOLIC BLOOD PRESSURE: 132 MMHG

## 2024-06-21 DIAGNOSIS — E11.22 TYPE 2 DIABETES MELLITUS WITH STAGE 3B CHRONIC KIDNEY DISEASE, UNSPECIFIED WHETHER LONG TERM INSULIN USE (MULTI): ICD-10-CM

## 2024-06-21 DIAGNOSIS — I12.9 HYPERTENSIVE KIDNEY DISEASE WITH STAGE 3B CHRONIC KIDNEY DISEASE (MULTI): Primary | ICD-10-CM

## 2024-06-21 DIAGNOSIS — N18.32 TYPE 2 DIABETES MELLITUS WITH STAGE 3B CHRONIC KIDNEY DISEASE, UNSPECIFIED WHETHER LONG TERM INSULIN USE (MULTI): ICD-10-CM

## 2024-06-21 DIAGNOSIS — N18.32 HYPERTENSIVE KIDNEY DISEASE WITH STAGE 3B CHRONIC KIDNEY DISEASE (MULTI): Primary | ICD-10-CM

## 2024-06-21 PROCEDURE — 99213 OFFICE O/P EST LOW 20 MIN: CPT | Performed by: INTERNAL MEDICINE

## 2024-06-21 PROCEDURE — 4010F ACE/ARB THERAPY RXD/TAKEN: CPT | Performed by: INTERNAL MEDICINE

## 2024-06-21 PROCEDURE — 3075F SYST BP GE 130 - 139MM HG: CPT | Performed by: INTERNAL MEDICINE

## 2024-06-21 PROCEDURE — 1036F TOBACCO NON-USER: CPT | Performed by: INTERNAL MEDICINE

## 2024-06-21 PROCEDURE — 3078F DIAST BP <80 MM HG: CPT | Performed by: INTERNAL MEDICINE

## 2024-06-21 RX ORDER — ACETAMINOPHEN 500 MG
1 TABLET ORAL ONCE
Qty: 1 EACH | Refills: 0 | Status: SHIPPED | OUTPATIENT
Start: 2024-06-21 | End: 2024-06-22

## 2024-06-21 NOTE — PROGRESS NOTES
Subjective   Patient ID: Gurpreet Zimmer is a 63 y.o. male who presents for Chronic Kidney Disease.  HPI    Review of Systems    Objective   Physical Exam    Assessment/Plan   {Assess/PlanSmartLinks:[id number]}         Kianna Merrill MA 06/21/24 2:04 PM

## 2024-06-21 NOTE — PROGRESS NOTES
Chief Complaint: Follow up CKD    No specific complaints  Denies nausea, vomiting, chest pain, dyspnea  No urinary symptoms    NAD  Sclera AI s inj  MMM, no sores  Deferred secondary to COVID  No edema  No tremor  No rash    CKD stage 3b   HTN not at goal today, generally well controlled  Proteinuria minimal when last checked  Diabetes well controlled    Labs today  Labs and follow up in 3-4 months  Clinical pharmacist to review blood pressure  Blood pressure cuff

## 2024-07-02 ENCOUNTER — TELEPHONE (OUTPATIENT)
Dept: NEPHROLOGY | Facility: CLINIC | Age: 64
End: 2024-07-02

## 2024-07-02 ENCOUNTER — LAB (OUTPATIENT)
Dept: LAB | Facility: LAB | Age: 64
End: 2024-07-02
Payer: COMMERCIAL

## 2024-07-02 DIAGNOSIS — I12.9 HYPERTENSIVE KIDNEY DISEASE WITH STAGE 3B CHRONIC KIDNEY DISEASE (MULTI): ICD-10-CM

## 2024-07-02 DIAGNOSIS — E11.9 CONTROLLED TYPE 2 DIABETES MELLITUS WITHOUT COMPLICATION, WITH LONG-TERM CURRENT USE OF INSULIN (MULTI): ICD-10-CM

## 2024-07-02 DIAGNOSIS — Z79.4 CONTROLLED TYPE 2 DIABETES MELLITUS WITHOUT COMPLICATION, WITH LONG-TERM CURRENT USE OF INSULIN (MULTI): ICD-10-CM

## 2024-07-02 DIAGNOSIS — N18.32 HYPERTENSIVE KIDNEY DISEASE WITH STAGE 3B CHRONIC KIDNEY DISEASE (MULTI): ICD-10-CM

## 2024-07-02 LAB
25(OH)D3 SERPL-MCNC: 46 NG/ML (ref 30–100)
ALBUMIN SERPL BCP-MCNC: 4.3 G/DL (ref 3.4–5)
ALP SERPL-CCNC: 68 U/L (ref 33–136)
ALT SERPL W P-5'-P-CCNC: 14 U/L (ref 10–52)
ANION GAP SERPL CALC-SCNC: 15 MMOL/L (ref 10–20)
APPEARANCE UR: CLEAR
AST SERPL W P-5'-P-CCNC: 15 U/L (ref 9–39)
BILIRUB SERPL-MCNC: 0.4 MG/DL (ref 0–1.2)
BILIRUB UR STRIP.AUTO-MCNC: NEGATIVE MG/DL
BUN SERPL-MCNC: 78 MG/DL (ref 6–23)
CALCIUM SERPL-MCNC: 9.4 MG/DL (ref 8.6–10.6)
CHLORIDE SERPL-SCNC: 113 MMOL/L (ref 98–107)
CO2 SERPL-SCNC: 20 MMOL/L (ref 21–32)
COLOR UR: ABNORMAL
CREAT SERPL-MCNC: 2.54 MG/DL (ref 0.5–1.3)
CREAT UR-MCNC: 76.4 MG/DL (ref 20–370)
CREAT UR-MCNC: 76.4 MG/DL (ref 20–370)
EGFRCR SERPLBLD CKD-EPI 2021: 28 ML/MIN/1.73M*2
ERYTHROCYTE [DISTWIDTH] IN BLOOD BY AUTOMATED COUNT: 12.8 % (ref 11.5–14.5)
EST. AVERAGE GLUCOSE BLD GHB EST-MCNC: 140 MG/DL
GLUCOSE SERPL-MCNC: 117 MG/DL (ref 74–99)
GLUCOSE UR STRIP.AUTO-MCNC: ABNORMAL MG/DL
HBA1C MFR BLD: 6.5 %
HCT VFR BLD AUTO: 40.2 % (ref 41–52)
HGB BLD-MCNC: 12.4 G/DL (ref 13.5–17.5)
HYALINE CASTS #/AREA URNS AUTO: ABNORMAL /LPF
KETONES UR STRIP.AUTO-MCNC: NEGATIVE MG/DL
LEUKOCYTE ESTERASE UR QL STRIP.AUTO: NEGATIVE
MCH RBC QN AUTO: 27.9 PG (ref 26–34)
MCHC RBC AUTO-ENTMCNC: 30.8 G/DL (ref 32–36)
MCV RBC AUTO: 90 FL (ref 80–100)
MICROALBUMIN UR-MCNC: 61.7 MG/L
MICROALBUMIN/CREAT UR: 80.8 UG/MG CREAT
MUCOUS THREADS #/AREA URNS AUTO: ABNORMAL /LPF
NITRITE UR QL STRIP.AUTO: NEGATIVE
NRBC BLD-RTO: 0 /100 WBCS (ref 0–0)
PH UR STRIP.AUTO: 5 [PH]
PHOSPHATE SERPL-MCNC: 5.4 MG/DL (ref 2.5–4.9)
PLATELET # BLD AUTO: 218 X10*3/UL (ref 150–450)
POTASSIUM SERPL-SCNC: 5.4 MMOL/L (ref 3.5–5.3)
PROT SERPL-MCNC: 6.8 G/DL (ref 6.4–8.2)
PROT UR STRIP.AUTO-MCNC: ABNORMAL MG/DL
PROT UR-ACNC: 26 MG/DL (ref 5–25)
PROT/CREAT UR: 0.34 MG/MG CREAT (ref 0–0.17)
PTH-INTACT SERPL-MCNC: 134.8 PG/ML (ref 18.5–88)
RBC # BLD AUTO: 4.45 X10*6/UL (ref 4.5–5.9)
RBC # UR STRIP.AUTO: NEGATIVE /UL
RBC #/AREA URNS AUTO: ABNORMAL /HPF
SODIUM SERPL-SCNC: 143 MMOL/L (ref 136–145)
SP GR UR STRIP.AUTO: 1.01
UROBILINOGEN UR STRIP.AUTO-MCNC: NORMAL MG/DL
WBC # BLD AUTO: 5.2 X10*3/UL (ref 4.4–11.3)
WBC #/AREA URNS AUTO: ABNORMAL /HPF

## 2024-07-02 PROCEDURE — 84100 ASSAY OF PHOSPHORUS: CPT

## 2024-07-02 PROCEDURE — 36415 COLL VENOUS BLD VENIPUNCTURE: CPT

## 2024-07-02 PROCEDURE — 83970 ASSAY OF PARATHORMONE: CPT

## 2024-07-02 PROCEDURE — 82043 UR ALBUMIN QUANTITATIVE: CPT

## 2024-07-02 PROCEDURE — 80053 COMPREHEN METABOLIC PANEL: CPT

## 2024-07-02 PROCEDURE — 82306 VITAMIN D 25 HYDROXY: CPT

## 2024-07-02 PROCEDURE — 81001 URINALYSIS AUTO W/SCOPE: CPT

## 2024-07-02 PROCEDURE — 83036 HEMOGLOBIN GLYCOSYLATED A1C: CPT

## 2024-07-02 PROCEDURE — 85027 COMPLETE CBC AUTOMATED: CPT

## 2024-07-02 PROCEDURE — 82570 ASSAY OF URINE CREATININE: CPT

## 2024-07-02 PROCEDURE — 84156 ASSAY OF PROTEIN URINE: CPT

## 2024-07-06 PROCEDURE — RXMED WILLOW AMBULATORY MEDICATION CHARGE

## 2024-07-10 ENCOUNTER — PHARMACY VISIT (OUTPATIENT)
Dept: PHARMACY | Facility: CLINIC | Age: 64
End: 2024-07-10
Payer: COMMERCIAL

## 2024-07-11 DIAGNOSIS — N18.4 CHRONIC KIDNEY DISEASE, STAGE 4 (SEVERE) (MULTI): Primary | ICD-10-CM

## 2024-08-05 PROCEDURE — RXMED WILLOW AMBULATORY MEDICATION CHARGE

## 2024-08-07 ENCOUNTER — PHARMACY VISIT (OUTPATIENT)
Dept: PHARMACY | Facility: CLINIC | Age: 64
End: 2024-08-07
Payer: COMMERCIAL

## 2024-09-01 PROCEDURE — RXMED WILLOW AMBULATORY MEDICATION CHARGE

## 2024-09-04 DIAGNOSIS — E11.9 CONTROLLED TYPE 2 DIABETES MELLITUS WITHOUT COMPLICATION, WITH LONG-TERM CURRENT USE OF INSULIN (MULTI): Primary | ICD-10-CM

## 2024-09-04 DIAGNOSIS — Z79.4 CONTROLLED TYPE 2 DIABETES MELLITUS WITHOUT COMPLICATION, WITH LONG-TERM CURRENT USE OF INSULIN (MULTI): Primary | ICD-10-CM

## 2024-09-04 RX ORDER — INSULIN LISPRO 100 [IU]/ML
INJECTION, SUSPENSION SUBCUTANEOUS
Qty: 15 ML | Refills: 6 | Status: SHIPPED | OUTPATIENT
Start: 2024-09-04

## 2024-09-06 ENCOUNTER — PHARMACY VISIT (OUTPATIENT)
Dept: PHARMACY | Facility: CLINIC | Age: 64
End: 2024-09-06
Payer: COMMERCIAL

## 2024-09-11 ENCOUNTER — APPOINTMENT (OUTPATIENT)
Dept: PRIMARY CARE | Facility: CLINIC | Age: 64
End: 2024-09-11
Payer: COMMERCIAL

## 2024-09-11 VITALS
SYSTOLIC BLOOD PRESSURE: 114 MMHG | TEMPERATURE: 97 F | HEIGHT: 63 IN | WEIGHT: 150 LBS | BODY MASS INDEX: 26.58 KG/M2 | HEART RATE: 50 BPM | DIASTOLIC BLOOD PRESSURE: 68 MMHG | RESPIRATION RATE: 16 BRPM

## 2024-09-11 DIAGNOSIS — J30.81 ALLERGIC RHINITIS DUE TO ANIMAL (CAT) (DOG) HAIR AND DANDER: ICD-10-CM

## 2024-09-11 DIAGNOSIS — E11.22 CKD STAGE 3 SECONDARY TO DIABETES (MULTI): ICD-10-CM

## 2024-09-11 DIAGNOSIS — M1A.09X1 IDIOPATHIC CHRONIC GOUT OF MULTIPLE SITES WITH TOPHUS: Primary | ICD-10-CM

## 2024-09-11 DIAGNOSIS — E23.0: ICD-10-CM

## 2024-09-11 DIAGNOSIS — L84 CALLUS OF FOOT: ICD-10-CM

## 2024-09-11 DIAGNOSIS — N18.30 CKD STAGE 3 SECONDARY TO DIABETES (MULTI): ICD-10-CM

## 2024-09-11 PROCEDURE — 1036F TOBACCO NON-USER: CPT | Performed by: FAMILY MEDICINE

## 2024-09-11 PROCEDURE — 3008F BODY MASS INDEX DOCD: CPT | Performed by: FAMILY MEDICINE

## 2024-09-11 PROCEDURE — 99214 OFFICE O/P EST MOD 30 MIN: CPT | Performed by: FAMILY MEDICINE

## 2024-09-11 PROCEDURE — 3074F SYST BP LT 130 MM HG: CPT | Performed by: FAMILY MEDICINE

## 2024-09-11 PROCEDURE — 4010F ACE/ARB THERAPY RXD/TAKEN: CPT | Performed by: FAMILY MEDICINE

## 2024-09-11 PROCEDURE — 3044F HG A1C LEVEL LT 7.0%: CPT | Performed by: FAMILY MEDICINE

## 2024-09-11 PROCEDURE — 3060F POS MICROALBUMINURIA REV: CPT | Performed by: FAMILY MEDICINE

## 2024-09-11 PROCEDURE — 3078F DIAST BP <80 MM HG: CPT | Performed by: FAMILY MEDICINE

## 2024-09-11 RX ORDER — FEBUXOSTAT 40 MG/1
40 TABLET, FILM COATED ORAL DAILY
Qty: 90 TABLET | Refills: 1 | Status: SHIPPED | OUTPATIENT
Start: 2025-01-11

## 2024-09-11 NOTE — PROGRESS NOTES
Gurpreet Zimmer is a 63 y.o. male here today for   Chief Complaint   Patient presents with    Gout        HPI     Gout recheck -- Patient reports that gout has been stable. There have been no significant flares since last visit. Patient is tolerating medications well with no side effects. Patient denies joint redness, swelling, pain, or nodules.  No flares.      Has noticed a little lump over his second metatarsal head on the plantar surface of his feet.  There is no pain associated or skin breakdown.    Left nostril more than the right nostril seems to be chronically congested.  Has cats.  No allergy medications.  No nosebleeds and no history of a septal deviation.      Current Outpatient Medications:     aspirin 81 mg EC tablet, Take 1 tablet (81 mg) by mouth once daily., Disp: , Rfl:     cholecalciferol (Vitamin D-3) 25 MCG (1000 UT) tablet, Take 1 tablet (25 mcg) by mouth every other day., Disp: , Rfl:     dapagliflozin propanediol (Farxiga) 10 mg, TAKE 1 TABLET BY MOUTH EVERY MORNING, Disp: 90 tablet, Rfl: 3    HumaLOG Mix 75-25 KwikPen 100 unit/mL (75-25) injection, INJECT 6 TO 8 UNITS TWICE A DAY, Disp: 15 mL, Rfl: 6    rosuvastatin (Crestor) 20 mg tablet, TAKE 1 TABLET DAILY, Disp: 90 tablet, Rfl: 3    [START ON 1/11/2025] febuxostat (Uloric) 40 mg tablet, Take 1 tablet (40 mg) by mouth once daily. Do not fill before January 11, 2025., Disp: 90 tablet, Rfl: 1    losartan (Cozaar) 25 mg tablet, Take 1 tablet (25 mg) by mouth once daily., Disp: , Rfl:     Patient Active Problem List   Diagnosis    Benign essential hypertension    CKD stage 3 secondary to diabetes ([facility])    Diabetes type 2, controlled ([facility])    Idiopathic chronic gout of multiple sites with tophus    Mixed hyperlipidemia    Hypothyroidism    Partial hypopituitarism (Multi)    Osteoporosis    Familial combined hyperlipidemia    Chondromalacia of patella    Callus of foot    Allergic rhinitis due to animal (cat) (dog) hair and dander         No  "results found for this or any previous visit (from the past 672 hour(s)).     Objective    Visit Vitals    Visit Vitals  /68   Pulse 50   Temp 36.1 °C (97 °F)   Resp 16   Ht 1.6 m (5' 3\")   Wt 68 kg (150 lb)   BMI 26.57 kg/m²   Smoking Status Never   BSA 1.74 m²       Body mass index is 26.57 kg/m².     Physical Exam     General - Not in acute distress and cooperative.  Build & Nutrition - Well developed  Posture - Normal  Gait - Normal  Mental Status - alert and oriented x 3    Head - Normocephalic    Neck - Thyroid normal size    Eyes - Bilateral - Sclera clear and lids pink without edema or mass.      Skin - Warm and dry with no rashes on visible skin    Lungs - Clear to auscultation and normal breathing effort    Cardiovascular - RRR and no murmurs, rubs or thrill.    Peripheral Vascular - Bilateral - no edema present    Neuropsychiatric - normal mood and affect    Feet-patient has a small callus over the bilateral second metatarsal heads on the plantar surface.  There is no skin breakdown or signs of infection.  There is no tenderness.    Nose-mucous membranes are inflamed and boggy bilaterally.  He does have some swelling of the turbinates bilaterally.    Assessment    1. Idiopathic chronic gout of multiple sites with tophus  febuxostat (Uloric) 40 mg tablet, Uric Acid   The patient's most recent labs show that he has had a slow decline of renal function.  We discussed that it is necessary to decrease Uloric dose to help protect his kidneys and we will decrease to 40 mg daily.  After he decreases the dose I want him to get a uric acid level in about 1 month to make sure that it is still well-controlled.     2. Partial hypopituitarism (Multi)     This is stable and monitored by his endocrinologist     3. CKD stage 3 secondary to diabetes (Multi)        4. Callus of foot     I recommend that he use over-the-counter callus pads and gentle abrasion with a foot file about once per month.     5. Allergic " rhinitis due to animal (cat) (dog) hair and dander     I recommend that he use over-the-counter fluticasone nasal spray daily.         Orders Placed This Encounter      febuxostat (Uloric) 40 mg tablet       Orders Placed This Encounter   Procedures    Uric Acid        New Medications Ordered This Visit   Medications    febuxostat (Uloric) 40 mg tablet     Sig: Take 1 tablet (40 mg) by mouth once daily. Do not fill before January 11, 2025.     Dispense:  90 tablet     Refill:  1

## 2024-10-01 PROCEDURE — RXMED WILLOW AMBULATORY MEDICATION CHARGE

## 2024-10-03 ENCOUNTER — PHARMACY VISIT (OUTPATIENT)
Dept: PHARMACY | Facility: CLINIC | Age: 64
End: 2024-10-03
Payer: COMMERCIAL

## 2024-10-29 PROCEDURE — RXMED WILLOW AMBULATORY MEDICATION CHARGE

## 2024-10-31 ENCOUNTER — PHARMACY VISIT (OUTPATIENT)
Dept: PHARMACY | Facility: CLINIC | Age: 64
End: 2024-10-31
Payer: COMMERCIAL

## 2024-11-07 ENCOUNTER — LAB (OUTPATIENT)
Dept: LAB | Facility: LAB | Age: 64
End: 2024-11-07
Payer: COMMERCIAL

## 2024-11-07 DIAGNOSIS — M1A.09X1 IDIOPATHIC CHRONIC GOUT OF MULTIPLE SITES WITH TOPHUS: ICD-10-CM

## 2024-11-07 DIAGNOSIS — I12.9 HYPERTENSIVE KIDNEY DISEASE WITH STAGE 3B CHRONIC KIDNEY DISEASE (MULTI): ICD-10-CM

## 2024-11-07 DIAGNOSIS — N18.32 HYPERTENSIVE KIDNEY DISEASE WITH STAGE 3B CHRONIC KIDNEY DISEASE (MULTI): ICD-10-CM

## 2024-11-07 LAB
ALBUMIN SERPL BCP-MCNC: 4.4 G/DL (ref 3.4–5)
ANION GAP SERPL CALC-SCNC: 15 MMOL/L (ref 10–20)
APPEARANCE UR: CLEAR
BILIRUB UR STRIP.AUTO-MCNC: NEGATIVE MG/DL
BUN SERPL-MCNC: 70 MG/DL (ref 6–23)
CALCIUM SERPL-MCNC: 9.8 MG/DL (ref 8.6–10.6)
CHLORIDE SERPL-SCNC: 110 MMOL/L (ref 98–107)
CO2 SERPL-SCNC: 21 MMOL/L (ref 21–32)
COLOR UR: ABNORMAL
CREAT SERPL-MCNC: 2.8 MG/DL (ref 0.5–1.3)
CREAT UR-MCNC: 61.1 MG/DL (ref 20–370)
CREAT UR-MCNC: 61.1 MG/DL (ref 20–370)
EGFRCR SERPLBLD CKD-EPI 2021: 25 ML/MIN/1.73M*2
ERYTHROCYTE [DISTWIDTH] IN BLOOD BY AUTOMATED COUNT: 12.9 % (ref 11.5–14.5)
GLUCOSE SERPL-MCNC: 136 MG/DL (ref 74–99)
GLUCOSE UR STRIP.AUTO-MCNC: ABNORMAL MG/DL
GRAN CASTS #/AREA UR COMP ASSIST: ABNORMAL /LPF
HCT VFR BLD AUTO: 44.3 % (ref 41–52)
HGB BLD-MCNC: 13.4 G/DL (ref 13.5–17.5)
KETONES UR STRIP.AUTO-MCNC: NEGATIVE MG/DL
LEUKOCYTE ESTERASE UR QL STRIP.AUTO: NEGATIVE
MCH RBC QN AUTO: 27.3 PG (ref 26–34)
MCHC RBC AUTO-ENTMCNC: 30.2 G/DL (ref 32–36)
MCV RBC AUTO: 90 FL (ref 80–100)
MICROALBUMIN UR-MCNC: 121.9 MG/L
MICROALBUMIN/CREAT UR: 199.5 UG/MG CREAT
MUCOUS THREADS #/AREA URNS AUTO: ABNORMAL /LPF
NITRITE UR QL STRIP.AUTO: NEGATIVE
NRBC BLD-RTO: 0 /100 WBCS (ref 0–0)
PH UR STRIP.AUTO: 5 [PH]
PHOSPHATE SERPL-MCNC: 4.3 MG/DL (ref 2.5–4.9)
PLATELET # BLD AUTO: 216 X10*3/UL (ref 150–450)
POTASSIUM SERPL-SCNC: 5.1 MMOL/L (ref 3.5–5.3)
PROT UR STRIP.AUTO-MCNC: ABNORMAL MG/DL
PROT UR-ACNC: 43 MG/DL (ref 5–25)
PROT/CREAT UR: 0.7 MG/MG CREAT (ref 0–0.17)
RBC # BLD AUTO: 4.91 X10*6/UL (ref 4.5–5.9)
RBC # UR STRIP.AUTO: NEGATIVE /UL
RBC #/AREA URNS AUTO: ABNORMAL /HPF
SODIUM SERPL-SCNC: 141 MMOL/L (ref 136–145)
SP GR UR STRIP.AUTO: 1.01
URATE SERPL-MCNC: 3.5 MG/DL (ref 4–7.5)
UROBILINOGEN UR STRIP.AUTO-MCNC: NORMAL MG/DL
WBC # BLD AUTO: 6.6 X10*3/UL (ref 4.4–11.3)
WBC #/AREA URNS AUTO: ABNORMAL /HPF

## 2024-11-07 PROCEDURE — 84550 ASSAY OF BLOOD/URIC ACID: CPT

## 2024-11-07 PROCEDURE — 36415 COLL VENOUS BLD VENIPUNCTURE: CPT

## 2024-11-07 PROCEDURE — 82570 ASSAY OF URINE CREATININE: CPT

## 2024-11-07 PROCEDURE — 85027 COMPLETE CBC AUTOMATED: CPT

## 2024-11-07 PROCEDURE — 80069 RENAL FUNCTION PANEL: CPT

## 2024-11-07 PROCEDURE — 84156 ASSAY OF PROTEIN URINE: CPT

## 2024-11-07 PROCEDURE — 81001 URINALYSIS AUTO W/SCOPE: CPT

## 2024-11-07 PROCEDURE — 82043 UR ALBUMIN QUANTITATIVE: CPT

## 2024-11-13 ENCOUNTER — APPOINTMENT (OUTPATIENT)
Dept: ENDOCRINOLOGY | Facility: CLINIC | Age: 64
End: 2024-11-13
Payer: COMMERCIAL

## 2024-11-13 VITALS
HEART RATE: 76 BPM | SYSTOLIC BLOOD PRESSURE: 122 MMHG | DIASTOLIC BLOOD PRESSURE: 82 MMHG | WEIGHT: 147.6 LBS | BODY MASS INDEX: 26.15 KG/M2 | RESPIRATION RATE: 16 BRPM | HEIGHT: 63 IN

## 2024-11-13 DIAGNOSIS — E11.9 CONTROLLED TYPE 2 DIABETES MELLITUS WITHOUT COMPLICATION, WITH LONG-TERM CURRENT USE OF INSULIN (MULTI): Primary | ICD-10-CM

## 2024-11-13 DIAGNOSIS — E78.5 HYPERLIPIDEMIA, UNSPECIFIED HYPERLIPIDEMIA TYPE: ICD-10-CM

## 2024-11-13 DIAGNOSIS — Z79.4 CONTROLLED TYPE 2 DIABETES MELLITUS WITHOUT COMPLICATION, WITH LONG-TERM CURRENT USE OF INSULIN (MULTI): Primary | ICD-10-CM

## 2024-11-13 DIAGNOSIS — I10 BENIGN ESSENTIAL HYPERTENSION: ICD-10-CM

## 2024-11-13 PROCEDURE — 3074F SYST BP LT 130 MM HG: CPT | Performed by: INTERNAL MEDICINE

## 2024-11-13 PROCEDURE — 3008F BODY MASS INDEX DOCD: CPT | Performed by: INTERNAL MEDICINE

## 2024-11-13 PROCEDURE — 1036F TOBACCO NON-USER: CPT | Performed by: INTERNAL MEDICINE

## 2024-11-13 PROCEDURE — 3044F HG A1C LEVEL LT 7.0%: CPT | Performed by: INTERNAL MEDICINE

## 2024-11-13 PROCEDURE — 4010F ACE/ARB THERAPY RXD/TAKEN: CPT | Performed by: INTERNAL MEDICINE

## 2024-11-13 PROCEDURE — 3060F POS MICROALBUMINURIA REV: CPT | Performed by: INTERNAL MEDICINE

## 2024-11-13 PROCEDURE — 3079F DIAST BP 80-89 MM HG: CPT | Performed by: INTERNAL MEDICINE

## 2024-11-13 PROCEDURE — 99214 OFFICE O/P EST MOD 30 MIN: CPT | Performed by: INTERNAL MEDICINE

## 2024-11-13 ASSESSMENT — ENCOUNTER SYMPTOMS
FEVER: 0
NAUSEA: 0
SHORTNESS OF BREATH: 0
VOMITING: 0
CHILLS: 0
DIARRHEA: 0
LIGHT-HEADEDNESS: 0
DIZZINESS: 0

## 2024-11-13 NOTE — PROGRESS NOTES
Endocrinology: Follow up visit  Subjective   Patient ID: Gurpreet Zimmer is a 64 y.o. male who presents for Diabetes (Type 2 ) and Hyperlipidemia.    PCP: Rigoberto Mike MD    HPI  Dm2:  Farxiga 10 mg  75/25  5/5    Since last visit doing great with sugars  Occ lows usually with increased activity during the summer    Feeling fine no complaints  Still  has not scheduled eye exam but plans to     Review of Systems   Constitutional:  Negative for chills and fever.   Respiratory:  Negative for shortness of breath.    Gastrointestinal:  Negative for diarrhea, nausea and vomiting.   Endocrine: Negative for cold intolerance and heat intolerance.   Neurological:  Negative for dizziness and light-headedness.       Patient Active Problem List   Diagnosis    Benign essential hypertension    CKD stage 3 secondary to diabetes (Multi)    Diabetes type 2, controlled (Multi)    Idiopathic chronic gout of multiple sites with tophus    Mixed hyperlipidemia    Hypothyroidism    Partial hypopituitarism (Multi)    Osteoporosis    Familial combined hyperlipidemia    Chondromalacia of patella    Callus of foot    Allergic rhinitis due to animal (cat) (dog) hair and dander        Home Meds:  Current Outpatient Medications   Medication Instructions    aspirin 81 mg EC tablet 1 tablet, Daily    cholecalciferol (Vitamin D-3) 25 MCG (1000 UT) tablet 1 tablet, Every other day    dapagliflozin propanediol (Farxiga) 10 mg TAKE 1 TABLET BY MOUTH EVERY MORNING    [START ON 1/11/2025] febuxostat (ULORIC) 40 mg, oral, Daily    HumaLOG Mix 75-25 KwikPen 100 unit/mL (75-25) injection INJECT 6 TO 8 UNITS TWICE A DAY    losartan (Cozaar) 25 mg tablet 1 tablet, Daily    rosuvastatin (CRESTOR) 20 mg, oral, Daily        Allergies   Allergen Reactions    Allopurinol Anaphylaxis and Angioedema        Objective   Vitals:    11/13/24 0805   BP: 122/82   Pulse: 76   Resp: 16      Vitals:    11/13/24 0805   Weight: 67 kg (147 lb 9.6 oz)      Body mass index is  "26.15 kg/m².   Physical Exam  Constitutional:       Appearance: Normal appearance. He is overweight.   HENT:      Head: Normocephalic and atraumatic.   Neck:      Thyroid: No thyroid mass, thyromegaly or thyroid tenderness.   Cardiovascular:      Rate and Rhythm: Normal rate and regular rhythm.      Heart sounds: No murmur heard.     No gallop.   Pulmonary:      Effort: Pulmonary effort is normal.      Breath sounds: Normal breath sounds.   Abdominal:      Palpations: Abdomen is soft.      Comments: benign   Neurological:      General: No focal deficit present.      Mental Status: He is alert and oriented to person, place, and time.      Deep Tendon Reflexes: Reflexes are normal and symmetric.   Psychiatric:         Behavior: Behavior is cooperative.         Labs:  Lab Results   Component Value Date    HGBA1C 6.5 (H) 07/02/2024    TSH 1.47 05/26/2021      No results found for: \"PR1\", \"THYROIDPAB\", \"TSI\"     Assessment/Plan   Assessment & Plan  Controlled type 2 diabetes mellitus without complication, with long-term current use of insulin (Multi)    Orders:    CBC; Future    Comprehensive Metabolic Panel; Future    Lipid Panel; Future    Hemoglobin A1C; Future  fasting labs when able  Sugars are good  Benign essential hypertension    Bp excellent  Hyperlipidemia, unspecified hyperlipidemia type    Stable on statin, due for recheck      Electronically signed by:  Juanis Reece MD 11/13/24 8:05 AM              "

## 2024-11-13 NOTE — ASSESSMENT & PLAN NOTE
Orders:    CBC; Future    Comprehensive Metabolic Panel; Future    Lipid Panel; Future    Hemoglobin A1C; Future  fasting labs when able  Sugars are good   Protopic Counseling: Patient may experience a mild burning sensation during topical application. Protopic is not approved in children less than 2 years of age. There have been case reports of hematologic and skin malignancies in patients using topical calcineurin inhibitors although causality is questionable.

## 2024-11-13 NOTE — ASSESSMENT & PLAN NOTE
After Visit Summary   5/4/2018    Jose Luisdavion Gonzáles    MRN: [id number]           Patient Information     Date Of Birth          1969        Visit Information        Provider Department      5/4/2018 3:52 PM Tonie Cui MD [facility]        Today's Diagnoses     ERRONEOUS ENCOUNTER--DISREGARD    -  1       Follow-ups after your visit        Who to contact     If you have questions or need follow up information about today's clinic visit or your schedule please contact [facility] directly at [phone].  Normal or non-critical lab and imaging results will be communicated to you by [email or web address], letter or phone within 4 business days after the clinic has received the results. If you do not hear from us within 7 days, please contact the clinic through [employer] or phone. If you have a critical or abnormal lab result, we will notify you by phone as soon as possible.  Submit refill requests through [employer] or call your pharmacy and they will forward the refill request to us. Please allow 3 business days for your refill to be completed.          Additional Information About Your Visit        MyChart Information     [employer] gives you secure access to your electronic health record. If you see a primary care provider, you can also send messages to your care team and make appointments. If you have questions, please call your primary care clinic.  If you do not have a primary care provider, please call [phone] and they will assist you.        Care EveryWhere ID     This is your Care EveryWhere ID. This could be used by other organizations to access your [place] medical records  [id number]         Blood Pressure from Last 3 Encounters:   02/26/18 (!) 140/98   11/14/17 122/80   08/15/17 126/75    Weight from Last 3 Encounters:   02/26/18 76.2 kg (168 lb 1.6 oz)   11/14/17 79.9 kg (176 lb 3.2 oz)   08/15/17 76.2 kg (167 lb 14.4 oz)              Today, you had the    Bp excellent   following     No orders found for display       Primary Care Provider Office Phone # Fax #    Tonie Zach Cui [phone] [address] [facility] [address]        Equal Access to Services     GIANCARLO LIU : Hadii bishop ku alfredo Sokarriali, waaxda luqadaha, qaybta kaalmada adeegyada, waxtina idiin stevenn adealessia newman laTroywellington yarbrough. So [facility] [phone].    ATENCIÓN: Si habla español, tiene a ceron disposición servicios gratuitos de asistencia lingüística. Llame al [phone].    We comply with applicable federal civil rights laws and [place] laws. We do not discriminate on the basis of race, color, national origin, age, disability, sex, sexual orientation, or gender identity.            Thank you!     Thank you for choosing [facility]  for your care. Our goal is always to provide you with excellent care. Hearing back from our patients is one way we can continue to improve our services. Please take a few minutes to complete the written survey that you may receive in the mail after your visit with us. Thank you!             Your Updated Medication List - Protect others around you: Learn how to safely use, store and throw away your medicines at www.disposemymeds.org.          This list is accurate as of 5/4/18 11:59 PM.  Always use your most recent med list.                   Brand Name Dispense Instructions for use Diagnosis    adapalene 0.1 % gel    DIFFERIN    45 g    Apply topically At Bedtime    Acne vulgaris       clonazePAM 1 MG tablet    klonoPIN    180 tablet    TAKE 1-2 TABLETS BY MOUTH THREE TIMES DAILY AS NEEDED FOR ANXIETY    Anxiety       diazepam 10 MG tablet    VALIUM    60 tablet    TAKE 1 TABLET BY MOUTH TWICE DAILY AS NEEDED FOR SLEEP    Chronic pain syndrome, Neck pain       finasteride 5 MG tablet    PROSCAR    30 tablet    Take 1 tablet (5 mg) by mouth daily    Male pattern baldness       mupirocin 2 % ointment    BACTROBAN    22 g    Apply topically  3 times daily    Facial cellulitis       omeprazole 20 MG tablet    priLOSEC OTC    90 tablet    Take 1 tablet (20 mg) by mouth daily    Gastroesophageal reflux disease without esophagitis       order for DME     1 Bottle    Actavis testosterone gel 1% 50 mg daily to intact skin    Male hypogonadism       * oxyCODONE IR 15 MG tablet    ROXICODONE    30 tablet    Take 1-2 tablets (15-30 mg) by mouth every 4 hours as needed for breakthrough pain Max 6 tabs per day for 5 days    Pain, dental       * oxyCODONE IR 30 MG tablet   Start taking on:  5/10/2018    ROXICODONE    60 tablet    Take 1 tablet (30 mg) by mouth 4 times daily In combination with percocet.    Chronic pain syndrome, Neck pain       PERCOCET  MG per tablet   Generic drug:  oxyCODONE-acetaminophen   Start taking on:  5/10/2018     120 tablet    Take 2 tablets by mouth 4 times daily In combination with oxy IR.    Chronic pain syndrome, Neck pain       polyethylene glycol powder    MIRALAX/GLYCOLAX    527 g    STIR 17 GM OF POWDER (SEE MINGO INSIDE CAP) IN 8-OZ OF LIQUID UNTIL COMPLETELY DISSOLVED. DRINK THE SOLUTION DAILY OR AS DIRECTED.    Chronic constipation       tretinoin 0.1 % cream    RETIN-A    45 g    Apply topically At Bedtime    Acne vulgaris       * Notice:  This list has 2 medication(s) that are the same as other medications prescribed for you. Read the directions carefully, and ask your doctor or other care provider to review them with you.

## 2024-12-06 ENCOUNTER — APPOINTMENT (OUTPATIENT)
Dept: NEPHROLOGY | Facility: CLINIC | Age: 64
End: 2024-12-06
Payer: COMMERCIAL

## 2024-12-06 VITALS
HEART RATE: 60 BPM | RESPIRATION RATE: 16 BRPM | HEIGHT: 63 IN | DIASTOLIC BLOOD PRESSURE: 75 MMHG | BODY MASS INDEX: 25.85 KG/M2 | SYSTOLIC BLOOD PRESSURE: 155 MMHG | WEIGHT: 145.9 LBS

## 2024-12-06 DIAGNOSIS — N18.30 CKD STAGE 3 SECONDARY TO DIABETES (MULTI): Primary | ICD-10-CM

## 2024-12-06 DIAGNOSIS — E11.22 CKD STAGE 3 SECONDARY TO DIABETES (MULTI): Primary | ICD-10-CM

## 2024-12-06 PROCEDURE — 3044F HG A1C LEVEL LT 7.0%: CPT | Performed by: INTERNAL MEDICINE

## 2024-12-06 PROCEDURE — 3008F BODY MASS INDEX DOCD: CPT | Performed by: INTERNAL MEDICINE

## 2024-12-06 PROCEDURE — 4010F ACE/ARB THERAPY RXD/TAKEN: CPT | Performed by: INTERNAL MEDICINE

## 2024-12-06 PROCEDURE — 3060F POS MICROALBUMINURIA REV: CPT | Performed by: INTERNAL MEDICINE

## 2024-12-06 PROCEDURE — 3078F DIAST BP <80 MM HG: CPT | Performed by: INTERNAL MEDICINE

## 2024-12-06 PROCEDURE — 3077F SYST BP >= 140 MM HG: CPT | Performed by: INTERNAL MEDICINE

## 2024-12-06 PROCEDURE — 99213 OFFICE O/P EST LOW 20 MIN: CPT | Performed by: INTERNAL MEDICINE

## 2024-12-09 ENCOUNTER — PHARMACY VISIT (OUTPATIENT)
Dept: PHARMACY | Facility: CLINIC | Age: 64
End: 2024-12-09
Payer: COMMERCIAL

## 2024-12-09 DIAGNOSIS — E11.22 CKD STAGE 3 SECONDARY TO DIABETES (MULTI): ICD-10-CM

## 2024-12-09 DIAGNOSIS — N18.30 CKD STAGE 3 SECONDARY TO DIABETES (MULTI): ICD-10-CM

## 2024-12-09 PROCEDURE — RXMED WILLOW AMBULATORY MEDICATION CHARGE

## 2024-12-09 RX ORDER — DAPAGLIFLOZIN 10 MG/1
10 TABLET, FILM COATED ORAL
Qty: 90 TABLET | Refills: 3 | Status: SHIPPED | OUTPATIENT
Start: 2024-12-09 | End: 2025-12-09

## 2024-12-13 NOTE — PROGRESS NOTES
Subjective   Patient ID: Gurpreet Zimmer is a 64 y.o. male who presents for ckd/htn    Referring Provider: kristopher MADSEN  HPI: CKD stage 4/a2. last EGFR 25 sCr 2.8 UACR 199.5  HTN:  /75 (patient reports was rushing to appointment, previous in office readings much better controlled)  Medications    DM:  A1C 6.5  Medications  Humalog 75/25 6-8 units bid  Farxiga 10mg every day    HLD:  LDL 86  On statin? Crestor 20mg     Medications reviewed for appropriate dosing in setting of CKD  Recommended changes:  - no adjustments needed at this time      Objective     There were no vitals taken for this visit.     Labs  Lab Results   Component Value Date    BILITOT 0.4 07/02/2024    CALCIUM 9.8 11/07/2024    CO2 21 11/07/2024     (H) 11/07/2024    CREATININE 2.80 (H) 11/07/2024    GLUCOSE 136 (H) 11/07/2024    ALKPHOS 68 07/02/2024    K 5.1 11/07/2024    PROT 6.8 07/02/2024     11/07/2024    AST 15 07/02/2024    ALT 14 07/02/2024    BUN 70 (H) 11/07/2024    ANIONGAP 15 11/07/2024    PHOS 4.3 11/07/2024    ALBUMIN 4.4 11/07/2024    GFRMALE 31 (A) 06/28/2023     Lab Results   Component Value Date    TRIG 188 (H) 12/05/2023    CHOL 165 12/05/2023    LDLCALC 86 12/05/2023    HDL 41.5 12/05/2023     Lab Results   Component Value Date    HGBA1C 6.5 (H) 07/02/2024       Current Outpatient Medications on File Prior to Visit   Medication Sig Dispense Refill    aspirin 81 mg EC tablet Take 1 tablet (81 mg) by mouth once daily.      cholecalciferol (Vitamin D-3) 25 MCG (1000 UT) tablet Take 1 tablet (25 mcg) by mouth every other day.      dapagliflozin propanediol (Farxiga) 10 mg TAKE 1 TABLET BY MOUTH EVERY MORNING 90 tablet 3    [START ON 1/11/2025] febuxostat (Uloric) 40 mg tablet Take 1 tablet (40 mg) by mouth once daily. Do not fill before January 11, 2025. 90 tablet 1    HumaLOG Mix 75-25 KwikPen 100 unit/mL (75-25) injection INJECT 6 TO 8 UNITS TWICE A DAY 15 mL 6    losartan (Cozaar) 25 mg tablet Take 1  tablet (25 mg) by mouth once daily. (Patient not taking: Reported on 11/13/2024)      rosuvastatin (Crestor) 20 mg tablet TAKE 1 TABLET DAILY 90 tablet 3    [DISCONTINUED] dapagliflozin propanediol (Farxiga) 10 mg TAKE 1 TABLET BY MOUTH EVERY MORNING 90 tablet 3     No current facility-administered medications on file prior to visit.        Assessment/Plan   PATIENT EDUCATION/DISCUSSION:  - Counseled patient on MOA, expectations, side effects, duration of therapy, contraindications, administration, and monitoring parameters  - Answered all patient questions and concerns  _______________________________________________________________________  PLAN  1. Get BP cuff and monitor 3-4 times per week. We will follow up on readings in 2 months 2/17 @ 940a  2. Prescription sent to [facility] pharmacy for assistance on authorization and copay. Medication will be mailed to patient.    Deonte Lopez, PharmD    Continue all meds under the continuation of care with the referring provider and clinical pharmacy team.

## 2024-12-16 ENCOUNTER — APPOINTMENT (OUTPATIENT)
Dept: PHARMACY | Facility: HOSPITAL | Age: 64
End: 2024-12-16
Payer: COMMERCIAL

## 2024-12-16 DIAGNOSIS — N18.30 CKD STAGE 3 SECONDARY TO DIABETES (MULTI): ICD-10-CM

## 2024-12-16 DIAGNOSIS — E11.22 CKD STAGE 3 SECONDARY TO DIABETES (MULTI): ICD-10-CM

## 2025-01-20 PROCEDURE — RXMED WILLOW AMBULATORY MEDICATION CHARGE

## 2025-01-21 ENCOUNTER — PHARMACY VISIT (OUTPATIENT)
Dept: PHARMACY | Facility: CLINIC | Age: 65
End: 2025-01-21
Payer: COMMERCIAL

## 2025-02-17 ENCOUNTER — APPOINTMENT (OUTPATIENT)
Dept: PHARMACY | Facility: HOSPITAL | Age: 65
End: 2025-02-17
Payer: COMMERCIAL

## 2025-02-17 DIAGNOSIS — E11.22 CKD STAGE 3 SECONDARY TO DIABETES (MULTI): ICD-10-CM

## 2025-02-17 DIAGNOSIS — N18.30 CKD STAGE 3 SECONDARY TO DIABETES (MULTI): ICD-10-CM

## 2025-02-17 NOTE — PROGRESS NOTES
Subjective   Patient ID: Gurpreet Zimmer is a 64 y.o. male who presents for ckd/htn    Referring Provider: kristopher MADSEN  HPI: CKD stage 4/a2. last EGFR 25 sCr 2.8 UACR 199.5  HTN:  /75 (patient reports was rushing to appointment, previous in office readings much better controlled)  Medications: N/A  Previously:   losartan - dsc'd dt hyperkalemia  Chlorthalidone - dsc'd dt hypotension when given with losartan    Home BPs: Has not gotten a BP machine yet, is agreeable to get one - recommended Omron dt verified status    DM:  A1C 6.5  Medications  Humalog 75/25 6-8 units bid  Farxiga 10mg every day    HLD:  LDL 86  On statin? Crestor 20mg     Medications reviewed for appropriate dosing in setting of CKD  Recommended changes:  - no adjustments needed at this time      Objective     There were no vitals taken for this visit.     Labs  Lab Results   Component Value Date    BILITOT 0.4 07/02/2024    CALCIUM 9.8 11/07/2024    CO2 21 11/07/2024     (H) 11/07/2024    CREATININE 2.80 (H) 11/07/2024    GLUCOSE 136 (H) 11/07/2024    ALKPHOS 68 07/02/2024    K 5.1 11/07/2024    PROT 6.8 07/02/2024     11/07/2024    AST 15 07/02/2024    ALT 14 07/02/2024    BUN 70 (H) 11/07/2024    ANIONGAP 15 11/07/2024    PHOS 4.3 11/07/2024    ALBUMIN 4.4 11/07/2024    GFRMALE 31 (A) 06/28/2023     Lab Results   Component Value Date    TRIG 188 (H) 12/05/2023    CHOL 165 12/05/2023    LDLCALC 86 12/05/2023    HDL 41.5 12/05/2023     Lab Results   Component Value Date    HGBA1C 6.5 (H) 07/02/2024       Current Outpatient Medications on File Prior to Visit   Medication Sig Dispense Refill    aspirin 81 mg EC tablet Take 1 tablet (81 mg) by mouth once daily.      cholecalciferol (Vitamin D-3) 25 MCG (1000 UT) tablet Take 1 tablet (25 mcg) by mouth every other day.      dapagliflozin propanediol (Farxiga) 10 mg TAKE 1 TABLET BY MOUTH EVERY MORNING 90 tablet 3    febuxostat (Uloric) 40 mg tablet Take 1 tablet (40 mg) by  mouth once daily. Do not fill before January 11, 2025. 90 tablet 1    HumaLOG Mix 75-25 KwikPen 100 unit/mL (75-25) injection INJECT 6 TO 8 UNITS TWICE A DAY 15 mL 6    rosuvastatin (Crestor) 20 mg tablet TAKE 1 TABLET DAILY 90 tablet 3     No current facility-administered medications on file prior to visit.        Assessment/Plan   PATIENT EDUCATION/DISCUSSION:  - Counseled patient on MOA, expectations, side effects, duration of therapy, contraindications, administration, and monitoring parameters  - Counseled patient on which BP monitor to get (Omron), to check BP values at least 3-4x/week, + to record values   - Answered all patient questions and concerns  _______________________________________________________________________  PLAN  Get a home monitor and monitor and record home Bps   2.   Follow up in two months    Follow up: 4/14/25 @9:40a    CECIL PEREZ    Continue all meds under the continuation of care with the referring provider and clinical pharmacy team.

## 2025-02-25 PROCEDURE — RXMED WILLOW AMBULATORY MEDICATION CHARGE

## 2025-02-28 ENCOUNTER — PHARMACY VISIT (OUTPATIENT)
Dept: PHARMACY | Facility: CLINIC | Age: 65
End: 2025-02-28
Payer: COMMERCIAL

## 2025-03-11 ENCOUNTER — APPOINTMENT (OUTPATIENT)
Dept: PRIMARY CARE | Facility: CLINIC | Age: 65
End: 2025-03-11
Payer: COMMERCIAL

## 2025-03-14 ENCOUNTER — PATIENT OUTREACH (OUTPATIENT)
Dept: CARE COORDINATION | Age: 65
End: 2025-03-14
Payer: COMMERCIAL

## 2025-03-14 DIAGNOSIS — E11.22 CONTROLLED TYPE 2 DIABETES MELLITUS WITH STAGE 3 CHRONIC KIDNEY DISEASE, WITHOUT LONG-TERM CURRENT USE OF INSULIN (MULTI): Primary | ICD-10-CM

## 2025-03-14 DIAGNOSIS — N18.30 CONTROLLED TYPE 2 DIABETES MELLITUS WITH STAGE 3 CHRONIC KIDNEY DISEASE, WITHOUT LONG-TERM CURRENT USE OF INSULIN (MULTI): Primary | ICD-10-CM

## 2025-03-14 NOTE — PROGRESS NOTES
MMO/RPM Referral: Pt returned call, discussed RPM, pt enrolled. Device delivery scheduled for 3/18.

## 2025-03-17 ENCOUNTER — PATIENT OUTREACH (OUTPATIENT)
Dept: CARE COORDINATION | Age: 65
End: 2025-03-17
Payer: COMMERCIAL

## 2025-03-17 NOTE — PROGRESS NOTES
Attempted call for call schedule and equipment setup instructions, no answer, LM on VM. Halnia Flower RN

## 2025-03-18 ENCOUNTER — DOCUMENTATION (OUTPATIENT)
Dept: CARE COORDINATION | Age: 65
End: 2025-03-18
Payer: COMMERCIAL

## 2025-03-18 NOTE — PROGRESS NOTES
Visited Mr Zimmer to set up MMO/RPM unable to connect to [facility] patients phone is not updated for Ivelisse. RN notified . Patient has equipment.

## 2025-03-19 ENCOUNTER — PATIENT OUTREACH (OUTPATIENT)
Dept: CARE COORDINATION | Age: 65
End: 2025-03-19
Payer: COMMERCIAL

## 2025-03-19 NOTE — PROGRESS NOTES
Attempted call for call schedule and equipment setup instructions, no answer, LM on VM. Halina Flower RN

## 2025-03-24 ENCOUNTER — PATIENT OUTREACH (OUTPATIENT)
Dept: CARE COORDINATION | Age: 65
End: 2025-03-24
Payer: COMMERCIAL

## 2025-03-27 ENCOUNTER — PATIENT OUTREACH (OUTPATIENT)
Dept: CARE COORDINATION | Age: 65
End: 2025-03-27
Payer: COMMERCIAL

## 2025-03-31 ENCOUNTER — PATIENT OUTREACH (OUTPATIENT)
Dept: CARE COORDINATION | Age: 65
End: 2025-03-31
Payer: COMMERCIAL

## 2025-03-31 NOTE — PROGRESS NOTES
Pt to be discharged from [facility]. Has stopped answering phone. Equipment scheduled for  on 4/4/2025.

## 2025-04-01 ENCOUNTER — APPOINTMENT (OUTPATIENT)
Dept: PRIMARY CARE | Facility: CLINIC | Age: 65
End: 2025-04-01
Payer: COMMERCIAL

## 2025-04-01 VITALS
RESPIRATION RATE: 16 BRPM | SYSTOLIC BLOOD PRESSURE: 122 MMHG | TEMPERATURE: 97 F | HEART RATE: 58 BPM | BODY MASS INDEX: 25.34 KG/M2 | HEIGHT: 63 IN | WEIGHT: 143 LBS | DIASTOLIC BLOOD PRESSURE: 78 MMHG

## 2025-04-01 DIAGNOSIS — M1A.09X1 IDIOPATHIC CHRONIC GOUT OF MULTIPLE SITES WITH TOPHUS: Primary | ICD-10-CM

## 2025-04-01 DIAGNOSIS — N18.4 CHRONIC KIDNEY DISEASE (CKD), STAGE IV (SEVERE) (MULTI): ICD-10-CM

## 2025-04-01 DIAGNOSIS — E23.0: ICD-10-CM

## 2025-04-01 PROBLEM — E11.22 CKD STAGE 3 SECONDARY TO DIABETES (MULTI): Status: RESOLVED | Noted: 2023-01-24 | Resolved: 2025-04-01

## 2025-04-01 PROBLEM — N18.30 CKD STAGE 3 SECONDARY TO DIABETES (MULTI): Status: RESOLVED | Noted: 2023-01-24 | Resolved: 2025-04-01

## 2025-04-01 PROCEDURE — 3078F DIAST BP <80 MM HG: CPT | Performed by: FAMILY MEDICINE

## 2025-04-01 PROCEDURE — 3008F BODY MASS INDEX DOCD: CPT | Performed by: FAMILY MEDICINE

## 2025-04-01 PROCEDURE — 3074F SYST BP LT 130 MM HG: CPT | Performed by: FAMILY MEDICINE

## 2025-04-01 PROCEDURE — 99213 OFFICE O/P EST LOW 20 MIN: CPT | Performed by: FAMILY MEDICINE

## 2025-04-01 PROCEDURE — 1036F TOBACCO NON-USER: CPT | Performed by: FAMILY MEDICINE

## 2025-04-01 RX ORDER — FEBUXOSTAT 40 MG/1
40 TABLET, FILM COATED ORAL DAILY
Qty: 90 TABLET | Refills: 1 | Status: SHIPPED | OUTPATIENT
Start: 2025-04-01

## 2025-04-01 NOTE — PROGRESS NOTES
"Gurpreet Zimmer is a 64 y.o. male here today for   Chief Complaint   Patient presents with    Gout        HPI     Gout recheck -- Patient reports that gout has been stable. There have been no significant flares since last visit. Patient is tolerating medications well with no side effects. Patient denies joint redness, swelling, pain, or nodules.  He takes Uloric 40 mg daily.  We decreased at last visit and no flares at all.      He reports his other chronic medical conditions are managed by his endocrinologist and nephrologist and have been stable.  I reviewed the most recent note from 11/13/2024.        Current Outpatient Medications:     aspirin 81 mg EC tablet, Take 1 tablet (81 mg) by mouth once daily., Disp: , Rfl:     cholecalciferol (Vitamin D-3) 25 MCG (1000 UT) tablet, Take 1 tablet (25 mcg) by mouth every other day., Disp: , Rfl:     dapagliflozin propanediol (Farxiga) 10 mg, TAKE 1 TABLET BY MOUTH EVERY MORNING, Disp: 90 tablet, Rfl: 3    HumaLOG Mix 75-25 KwikPen 100 unit/mL (75-25) injection, INJECT 6 TO 8 UNITS TWICE A DAY, Disp: 15 mL, Rfl: 6    rosuvastatin (Crestor) 20 mg tablet, TAKE 1 TABLET DAILY, Disp: 90 tablet, Rfl: 3    febuxostat (Uloric) 40 mg tablet, Take 1 tablet (40 mg) by mouth once daily., Disp: 90 tablet, Rfl: 1    Patient Active Problem List   Diagnosis    Benign essential hypertension    Diabetes type 2, controlled (Multi)    Idiopathic chronic gout of multiple sites with tophus    Mixed hyperlipidemia    Hypothyroidism    Partial hypopituitarism (Multi)    Osteoporosis    Familial combined hyperlipidemia    Chondromalacia of patella    Callus of foot    Allergic rhinitis due to animal (cat) (dog) hair and dander    Chronic kidney disease (CKD), stage IV (severe) (Multi)         Objective    Visit Vitals    Visit Vitals  /78   Pulse 58   Temp 36.1 °C (97 °F)   Resp 16   Ht 1.6 m (5' 3\")   Wt 64.9 kg (143 lb)   BMI 25.33 kg/m²   Smoking Status Never   BSA 1.7 m²       Body mass " index is 25.33 kg/m².     Physical Exam     General - Not in acute distress and cooperative.  Build & Nutrition - Well developed  Posture - Normal  Gait - Normal  Mental Status - alert and oriented x 3    Head - Normocephalic    Neck - Thyroid normal size    Eyes - Bilateral - Sclera clear and lids pink without edema or mass.      Skin - Warm and dry with no rashes on visible skin    Lungs - Clear to auscultation and normal breathing effort    Cardiovascular - RRR and no murmurs, rubs or thrill.    Peripheral Vascular - Bilateral - no edema present    Neuropsychiatric - normal mood and affect        Assessment & Plan  Idiopathic chronic gout of multiple sites with tophus  Condition well controlled.  No change in current treatment regimen.  Refill given of current medication.  Appropriate labs ordered or reviewed.  Make a follow up appointment with me for recheck in 6 months.    Orders:    Uric Acid; Future    febuxostat (Uloric) 40 mg tablet; Take 1 tablet (40 mg) by mouth once daily.    Chronic kidney disease (CKD), stage IV (severe) (Multi)  Stable and monitored by his nephrologist.       Partial hypopituitarism (Multi)  Stable and monitored by endocrinology.            Orders Placed This Encounter      febuxostat (Uloric) 40 mg tablet       Orders Placed This Encounter   Procedures    Uric Acid        New Medications Ordered This Visit   Medications    febuxostat (Uloric) 40 mg tablet     Sig: Take 1 tablet (40 mg) by mouth once daily.     Dispense:  90 tablet     Refill:  1

## 2025-04-01 NOTE — ASSESSMENT & PLAN NOTE
Condition well controlled.  No change in current treatment regimen.  Refill given of current medication.  Appropriate labs ordered or reviewed.  Make a follow up appointment with me for recheck in 6 months.    Orders:    Uric Acid; Future    febuxostat (Uloric) 40 mg tablet; Take 1 tablet (40 mg) by mouth once daily.

## 2025-04-03 PROCEDURE — RXMED WILLOW AMBULATORY MEDICATION CHARGE

## 2025-04-04 ENCOUNTER — PHARMACY VISIT (OUTPATIENT)
Dept: PHARMACY | Facility: CLINIC | Age: 65
End: 2025-04-04
Payer: COMMERCIAL

## 2025-04-04 ENCOUNTER — DOCUMENTATION (OUTPATIENT)
Dept: CARE COORDINATION | Age: 65
End: 2025-04-04
Payer: COMMERCIAL

## 2025-04-04 NOTE — PROGRESS NOTES
Went to [facility] to  MMO/RPM equipment , no answer at door. Left  with phone number . Patient called nurse line attempted to call patient, no answer. RN notified

## 2025-04-14 ENCOUNTER — APPOINTMENT (OUTPATIENT)
Dept: PHARMACY | Facility: HOSPITAL | Age: 65
End: 2025-04-14
Payer: COMMERCIAL

## 2025-04-28 PROCEDURE — RXMED WILLOW AMBULATORY MEDICATION CHARGE

## 2025-04-30 ENCOUNTER — PHARMACY VISIT (OUTPATIENT)
Dept: PHARMACY | Facility: CLINIC | Age: 65
End: 2025-04-30
Payer: COMMERCIAL

## 2025-05-13 ENCOUNTER — APPOINTMENT (OUTPATIENT)
Dept: ENDOCRINOLOGY | Facility: CLINIC | Age: 65
End: 2025-05-13
Payer: COMMERCIAL

## 2025-05-13 VITALS
HEIGHT: 63 IN | HEART RATE: 76 BPM | RESPIRATION RATE: 16 BRPM | SYSTOLIC BLOOD PRESSURE: 120 MMHG | WEIGHT: 144.4 LBS | DIASTOLIC BLOOD PRESSURE: 68 MMHG | BODY MASS INDEX: 25.59 KG/M2

## 2025-05-13 DIAGNOSIS — E78.5 HYPERLIPIDEMIA, UNSPECIFIED HYPERLIPIDEMIA TYPE: ICD-10-CM

## 2025-05-13 DIAGNOSIS — I10 BENIGN ESSENTIAL HYPERTENSION: ICD-10-CM

## 2025-05-13 DIAGNOSIS — Z79.4 CONTROLLED TYPE 2 DIABETES MELLITUS WITHOUT COMPLICATION, WITH LONG-TERM CURRENT USE OF INSULIN: Primary | ICD-10-CM

## 2025-05-13 DIAGNOSIS — E11.9 CONTROLLED TYPE 2 DIABETES MELLITUS WITHOUT COMPLICATION, WITH LONG-TERM CURRENT USE OF INSULIN: Primary | ICD-10-CM

## 2025-05-13 PROCEDURE — 3074F SYST BP LT 130 MM HG: CPT | Performed by: INTERNAL MEDICINE

## 2025-05-13 PROCEDURE — 99214 OFFICE O/P EST MOD 30 MIN: CPT | Performed by: INTERNAL MEDICINE

## 2025-05-13 PROCEDURE — 3008F BODY MASS INDEX DOCD: CPT | Performed by: INTERNAL MEDICINE

## 2025-05-13 PROCEDURE — 1036F TOBACCO NON-USER: CPT | Performed by: INTERNAL MEDICINE

## 2025-05-13 PROCEDURE — 3078F DIAST BP <80 MM HG: CPT | Performed by: INTERNAL MEDICINE

## 2025-05-13 ASSESSMENT — ENCOUNTER SYMPTOMS
SHORTNESS OF BREATH: 0
VOMITING: 0
DIARRHEA: 0
DIZZINESS: 0
LIGHT-HEADEDNESS: 0
CHILLS: 0
FEVER: 0
NAUSEA: 0

## 2025-05-13 NOTE — ASSESSMENT & PLAN NOTE
Fasting labs due  Sugars excellent and on very little insulin  Advised stopping it and monitoring sugars  Orders:    CBC; Future    Comprehensive Metabolic Panel; Future    Lipid Panel; Future    Hemoglobin A1C; Future    Albumin-Creatinine Ratio, Urine Random; Future

## 2025-05-13 NOTE — PROGRESS NOTES
Endocrinology: Follow up visit  Subjective   Patient ID: Gurpreet Zimmer is a 64 y.o. male who presents for Diabetes (Type 2 ) and Hyperlipidemia.    PCP: Rigoberto Mike MD    HPI  Dm2:  Farxiga 10 mg  75/25  5/5     Since last visit sugars are good sometimes in 70s.  He cut back insulin to once daily dosing since I last saw him  Has not scheduled eye exam yet but plans to  Review of Systems   Constitutional:  Negative for chills and fever.   Respiratory:  Negative for shortness of breath.    Gastrointestinal:  Negative for diarrhea, nausea and vomiting.   Endocrine: Negative for cold intolerance and heat intolerance.   Neurological:  Negative for dizziness and light-headedness.       Problem List[1]     Home Meds:  Current Outpatient Medications   Medication Instructions    aspirin 81 mg EC tablet 1 tablet, Daily    cholecalciferol (Vitamin D-3) 25 MCG (1000 UT) tablet 1 tablet, Every other day    dapagliflozin propanediol (Farxiga) 10 mg tablet TAKE 1 TABLET BY MOUTH EVERY MORNING    febuxostat (ULORIC) 40 mg, oral, Daily    HumaLOG Mix 75-25 KwikPen 100 unit/mL (75-25) injection INJECT 6 TO 8 UNITS TWICE A DAY    rosuvastatin (CRESTOR) 20 mg, oral, Daily        RX Allergies[2]     Objective   Vitals:    05/13/25 0802   BP: 120/68   Pulse: 76   Resp: 16      Vitals:    05/13/25 0802   Weight: 65.5 kg (144 lb 6.4 oz)      Body mass index is 25.58 kg/m².   Physical Exam  Constitutional:       Appearance: Normal appearance. He is overweight.   HENT:      Head: Normocephalic and atraumatic.   Neck:      Thyroid: No thyroid mass, thyromegaly or thyroid tenderness.   Cardiovascular:      Rate and Rhythm: Normal rate and regular rhythm.      Heart sounds: No murmur heard.     No gallop.   Pulmonary:      Effort: Pulmonary effort is normal.      Breath sounds: Normal breath sounds.   Abdominal:      Palpations: Abdomen is soft.      Comments: benign   Neurological:      General: No focal deficit present.      Mental  "Status: He is alert and oriented to person, place, and time.      Deep Tendon Reflexes: Reflexes are normal and symmetric.   Psychiatric:         Behavior: Behavior is cooperative.         Labs:  Lab Results   Component Value Date    HGBA1C 6.5 (H) 07/02/2024    TSH 1.47 05/26/2021      No results found for: \"PR1\", \"THYROIDPAB\", \"TSI\"     Assessment/Plan   Assessment & Plan  Controlled type 2 diabetes mellitus without complication, with long-term current use of insulin  Fasting labs due  Sugars excellent and on very little insulin  Advised stopping it and monitoring sugars  Orders:    CBC; Future    Comprehensive Metabolic Panel; Future    Lipid Panel; Future    Hemoglobin A1C; Future    Albumin-Creatinine Ratio, Urine Random; Future    Benign essential hypertension  Bp excellent  Hyperlipidemia, unspecified hyperlipidemia type    Due for lipids, not taking statin??      Electronically signed by:  Juanis Reece MD 05/13/25 8:06 AM                   [1]   Patient Active Problem List  Diagnosis    Benign essential hypertension    Diabetes type 2, controlled (Multi)    Idiopathic chronic gout of multiple sites with tophus    Mixed hyperlipidemia    Hypothyroidism    Partial hypopituitarism (Multi)    Osteoporosis    Familial combined hyperlipidemia    Chondromalacia of patella    Callus of foot    Allergic rhinitis due to animal (cat) (dog) hair and dander    Chronic kidney disease (CKD), stage IV (severe) (Multi)   [2]   Allergies  Allergen Reactions    Allopurinol Anaphylaxis and Angioedema     "

## 2025-05-24 ENCOUNTER — DOCUMENTATION (OUTPATIENT)
Dept: NEPHROLOGY | Facility: HOSPITAL | Age: 65
End: 2025-05-24
Payer: COMMERCIAL

## 2025-05-24 LAB
ALBUMIN SERPL-MCNC: 4.4 G/DL (ref 3.6–5.1)
ALBUMIN SERPL-MCNC: 4.6 G/DL (ref 3.6–5.1)
ALBUMIN/CREAT UR: 98 MG/G CREAT
ALP SERPL-CCNC: 75 U/L (ref 35–144)
ALT SERPL-CCNC: 11 U/L (ref 9–46)
ANION GAP SERPL CALCULATED.4IONS-SCNC: 9 MMOL/L (CALC) (ref 7–17)
APPEARANCE UR: CLEAR
AST SERPL-CCNC: 15 U/L (ref 10–35)
BACTERIA #/AREA URNS HPF: ABNORMAL /HPF
BILIRUB SERPL-MCNC: 0.4 MG/DL (ref 0.2–1.2)
BILIRUB UR QL STRIP: NEGATIVE
BUN SERPL-MCNC: 60 MG/DL (ref 7–25)
BUN SERPL-MCNC: 62 MG/DL (ref 7–25)
BUN/CREAT SERPL: 29 (CALC) (ref 6–22)
CALCIUM SERPL-MCNC: 9.9 MG/DL (ref 8.6–10.3)
CALCIUM SERPL-MCNC: 9.9 MG/DL (ref 8.6–10.3)
CHLORIDE SERPL-SCNC: 114 MMOL/L (ref 98–110)
CHLORIDE SERPL-SCNC: 114 MMOL/L (ref 98–110)
CHOLEST SERPL-MCNC: 164 MG/DL
CHOLEST/HDLC SERPL: 3.1 (CALC)
CO2 SERPL-SCNC: 22 MMOL/L (ref 20–32)
CO2 SERPL-SCNC: 23 MMOL/L (ref 20–32)
COLOR UR: YELLOW
CREAT SERPL-MCNC: 2.09 MG/DL (ref 0.7–1.35)
CREAT SERPL-MCNC: 2.11 MG/DL (ref 0.7–1.35)
CREAT UR-MCNC: 51 MG/DL (ref 20–320)
CREAT UR-MCNC: 52 MG/DL (ref 20–320)
EGFRCR SERPLBLD CKD-EPI 2021: 34 ML/MIN/1.73M2
EGFRCR SERPLBLD CKD-EPI 2021: 35 ML/MIN/1.73M2
ERYTHROCYTE [DISTWIDTH] IN BLOOD BY AUTOMATED COUNT: 13.7 % (ref 11–15)
ERYTHROCYTE [DISTWIDTH] IN BLOOD BY AUTOMATED COUNT: 14.3 % (ref 11–15)
EST. AVERAGE GLUCOSE BLD GHB EST-MCNC: 151 MG/DL
EST. AVERAGE GLUCOSE BLD GHB EST-SCNC: 8.4 MMOL/L
GLUCOSE SERPL-MCNC: 128 MG/DL (ref 65–99)
GLUCOSE SERPL-MCNC: 129 MG/DL (ref 65–99)
GLUCOSE UR QL STRIP: ABNORMAL
HBA1C MFR BLD: 6.9 %
HCT VFR BLD AUTO: 43.5 % (ref 38.5–50)
HCT VFR BLD AUTO: 43.9 % (ref 38.5–50)
HDLC SERPL-MCNC: 53 MG/DL
HGB BLD-MCNC: 13.4 G/DL (ref 13.2–17.1)
HGB BLD-MCNC: 13.6 G/DL (ref 13.2–17.1)
HGB UR QL STRIP: NEGATIVE
HYALINE CASTS #/AREA URNS LPF: ABNORMAL /LPF
KETONES UR QL STRIP: NEGATIVE
LDLC SERPL CALC-MCNC: 84 MG/DL (CALC)
LEUKOCYTE ESTERASE UR QL STRIP: NEGATIVE
MCH RBC QN AUTO: 27.6 PG (ref 27–33)
MCH RBC QN AUTO: 28.4 PG (ref 27–33)
MCHC RBC AUTO-ENTMCNC: 30.5 G/DL (ref 32–36)
MCHC RBC AUTO-ENTMCNC: 31.3 G/DL (ref 32–36)
MCV RBC AUTO: 90.5 FL (ref 80–100)
MCV RBC AUTO: 90.8 FL (ref 80–100)
MICROALBUMIN UR-MCNC: 5.1 MG/DL
NITRITE UR QL STRIP: NEGATIVE
NONHDLC SERPL-MCNC: 111 MG/DL (CALC)
PH UR STRIP: ABNORMAL [PH] (ref 5–8)
PHOSPHATE SERPL-MCNC: 4.5 MG/DL (ref 2.5–4.5)
PLATELET # BLD AUTO: 217 THOUSAND/UL (ref 140–400)
PLATELET # BLD AUTO: 224 THOUSAND/UL (ref 140–400)
PMV BLD REES-ECKER: 10 FL (ref 7.5–12.5)
PMV BLD REES-ECKER: 10.1 FL (ref 7.5–12.5)
POTASSIUM SERPL-SCNC: 6.2 MMOL/L (ref 3.5–5.3)
POTASSIUM SERPL-SCNC: 6.4 MMOL/L (ref 3.5–5.3)
PROT SERPL-MCNC: 6.9 G/DL (ref 6.1–8.1)
PROT UR QL STRIP: ABNORMAL
PROT UR-MCNC: 21 MG/DL (ref 5–25)
PROT/CREAT UR: 0.41 MG/MG CREAT (ref 0.03–0.15)
PROT/CREAT UR: 412 MG/G CREAT (ref 25–148)
RBC # BLD AUTO: 4.79 MILLION/UL (ref 4.2–5.8)
RBC # BLD AUTO: 4.85 MILLION/UL (ref 4.2–5.8)
RBC #/AREA URNS HPF: ABNORMAL /HPF
SERVICE CMNT-IMP: ABNORMAL
SODIUM SERPL-SCNC: 144 MMOL/L (ref 135–146)
SODIUM SERPL-SCNC: 145 MMOL/L (ref 135–146)
SP GR UR STRIP: 1.02 (ref 1–1.03)
SQUAMOUS #/AREA URNS HPF: ABNORMAL /HPF
TRIGL SERPL-MCNC: 167 MG/DL
URATE SERPL-MCNC: 4.1 MG/DL (ref 4–8)
WBC # BLD AUTO: 5.4 THOUSAND/UL (ref 3.8–10.8)
WBC # BLD AUTO: 5.6 THOUSAND/UL (ref 3.8–10.8)
WBC #/AREA URNS HPF: ABNORMAL /HPF

## 2025-05-24 NOTE — PROGRESS NOTES
Critical Lab value of K of 6.4, which resulted on 5/23  I called the patient and explained that he needs to go to the ED as soon as possible. As Hyperkalemia can be fatal  He understands  And will go to ED as soon as he can

## 2025-05-27 ENCOUNTER — DOCUMENTATION (OUTPATIENT)
Dept: CARE COORDINATION | Age: 65
End: 2025-05-27
Payer: COMMERCIAL

## 2025-05-27 LAB
ALBUMIN SERPL-MCNC: 4.6 G/DL (ref 3.6–5.1)
ALBUMIN/CREAT UR: 98 MG/G CREAT
ALP SERPL-CCNC: 75 U/L (ref 35–144)
ALT SERPL-CCNC: 11 U/L (ref 9–46)
ANION GAP SERPL CALCULATED.4IONS-SCNC: 9 MMOL/L (CALC) (ref 7–17)
AST SERPL-CCNC: 15 U/L (ref 10–35)
BILIRUB SERPL-MCNC: 0.4 MG/DL (ref 0.2–1.2)
BUN SERPL-MCNC: 60 MG/DL (ref 7–25)
CALCIUM SERPL-MCNC: 9.9 MG/DL (ref 8.6–10.3)
CHLORIDE SERPL-SCNC: 114 MMOL/L (ref 98–110)
CHOLEST SERPL-MCNC: 164 MG/DL
CHOLEST/HDLC SERPL: 3.1 (CALC)
CO2 SERPL-SCNC: 22 MMOL/L (ref 20–32)
CREAT SERPL-MCNC: 2.09 MG/DL (ref 0.7–1.35)
CREAT UR-MCNC: 52 MG/DL (ref 20–320)
EGFRCR SERPLBLD CKD-EPI 2021: 35 ML/MIN/1.73M2
ERYTHROCYTE [DISTWIDTH] IN BLOOD BY AUTOMATED COUNT: 14.3 % (ref 11–15)
EST. AVERAGE GLUCOSE BLD GHB EST-MCNC: 151 MG/DL
EST. AVERAGE GLUCOSE BLD GHB EST-SCNC: 8.4 MMOL/L
GLUCOSE SERPL-MCNC: 129 MG/DL (ref 65–99)
HBA1C MFR BLD: 6.9 %
HCT VFR BLD AUTO: 43.9 % (ref 38.5–50)
HDLC SERPL-MCNC: 53 MG/DL
HGB BLD-MCNC: 13.4 G/DL (ref 13.2–17.1)
LDLC SERPL CALC-MCNC: 84 MG/DL (CALC)
MCH RBC QN AUTO: 27.6 PG (ref 27–33)
MCHC RBC AUTO-ENTMCNC: 30.5 G/DL (ref 32–36)
MCV RBC AUTO: 90.5 FL (ref 80–100)
MICROALBUMIN UR-MCNC: 5.1 MG/DL
NONHDLC SERPL-MCNC: 111 MG/DL (CALC)
PLATELET # BLD AUTO: 224 THOUSAND/UL (ref 140–400)
PMV BLD REES-ECKER: 10.1 FL (ref 7.5–12.5)
POTASSIUM SERPL-SCNC: 6.2 MMOL/L (ref 3.5–5.3)
PROT SERPL-MCNC: 6.9 G/DL (ref 6.1–8.1)
RBC # BLD AUTO: 4.85 MILLION/UL (ref 4.2–5.8)
SODIUM SERPL-SCNC: 145 MMOL/L (ref 135–146)
TRIGL SERPL-MCNC: 167 MG/DL
WBC # BLD AUTO: 5.6 THOUSAND/UL (ref 3.8–10.8)

## 2025-05-27 NOTE — PROGRESS NOTES
DATE OF CALL:  5/27/2025    TIME OF CALL:  1215    RESULT CALLED BY:  Beatriz Alonzo     RESULT CALLED:  K 6.4    NOTIFIED:  Dr. Pretty Abbott   DATE NOTIFICATION:   5/27/25  TIME NOTIFICATION:   1218    R/B VERIFIED: YES/NO   Y    DID RESULT HAVE TO BE ESCALATED?  TO WHOM?

## 2025-05-29 DIAGNOSIS — N18.4 CHRONIC KIDNEY DISEASE (CKD), STAGE IV (SEVERE) (MULTI): Primary | ICD-10-CM

## 2025-05-29 NOTE — PROGRESS NOTES
Called and left message, still don't see that he went to ED after call about hyperkalemia over the weekend.

## 2025-05-30 ENCOUNTER — APPOINTMENT (OUTPATIENT)
Facility: CLINIC | Age: 65
End: 2025-05-30
Payer: COMMERCIAL

## 2025-05-30 VITALS
WEIGHT: 140 LBS | BODY MASS INDEX: 24.8 KG/M2 | HEIGHT: 63 IN | DIASTOLIC BLOOD PRESSURE: 73 MMHG | HEART RATE: 61 BPM | SYSTOLIC BLOOD PRESSURE: 150 MMHG

## 2025-05-30 DIAGNOSIS — N18.4 CHRONIC KIDNEY DISEASE (CKD), STAGE IV (SEVERE) (MULTI): Primary | ICD-10-CM

## 2025-05-30 PROCEDURE — 3078F DIAST BP <80 MM HG: CPT | Performed by: INTERNAL MEDICINE

## 2025-05-30 PROCEDURE — 3008F BODY MASS INDEX DOCD: CPT | Performed by: INTERNAL MEDICINE

## 2025-05-30 PROCEDURE — 99213 OFFICE O/P EST LOW 20 MIN: CPT | Performed by: INTERNAL MEDICINE

## 2025-05-30 PROCEDURE — 3077F SYST BP >= 140 MM HG: CPT | Performed by: INTERNAL MEDICINE

## 2025-05-30 RX ORDER — FUROSEMIDE 20 MG/1
20 TABLET ORAL DAILY
Qty: 5 TABLET | Refills: 0 | Status: SHIPPED | OUTPATIENT
Start: 2025-05-30 | End: 2025-06-04

## 2025-05-30 NOTE — PROGRESS NOTES
Chief Complaint: Follow up CKD    No specific complaints  Still hasn't had potassium drawn, has to go to work  Thinks he had too many potatoes last week  Stopped insulin because of low blood sugars  Denies nausea, vomiting, chest pain, dyspnea  No urinary symptoms    NAD  Sclera AI s inj  MMM, no sores  Deferred secondary to COVID  Trace edema  No tremor  No rash    CKD stage 3b   HTN not at goal today, generally well controlled  Proteinuria minimal   Diabetes well controlled  Hyperkalemia    Labs today (patient refuses, states he has to go to work  Lasix 20 mg daily for 5 days  Labs and follow up in 3-4 months  Discussed risks of hyperkalemia including cardiac conduction deficits, including heart stopping, asymptomatic nature  NKF potassium education   Nutritionist for potassium education; patient states he has nutritionist  Discussed interaction of insulin with potassium

## 2025-06-01 PROCEDURE — RXMED WILLOW AMBULATORY MEDICATION CHARGE

## 2025-06-03 LAB
ALBUMIN SERPL-MCNC: 4.4 G/DL (ref 3.6–5.1)
BUN SERPL-MCNC: 65 MG/DL (ref 7–25)
BUN/CREAT SERPL: 27 (CALC) (ref 6–22)
CALCIUM SERPL-MCNC: 9.6 MG/DL (ref 8.6–10.3)
CHLORIDE SERPL-SCNC: 114 MMOL/L (ref 98–110)
CO2 SERPL-SCNC: 23 MMOL/L (ref 20–32)
CREAT SERPL-MCNC: 2.39 MG/DL (ref 0.7–1.35)
EGFRCR SERPLBLD CKD-EPI 2021: 30 ML/MIN/1.73M2
GLUCOSE SERPL-MCNC: 105 MG/DL (ref 65–99)
PHOSPHATE SERPL-MCNC: 4.8 MG/DL (ref 2.5–4.5)
POTASSIUM SERPL-SCNC: 5.9 MMOL/L (ref 3.5–5.3)
SODIUM SERPL-SCNC: 146 MMOL/L (ref 135–146)

## 2025-06-04 ENCOUNTER — PHARMACY VISIT (OUTPATIENT)
Dept: PHARMACY | Facility: CLINIC | Age: 65
End: 2025-06-04
Payer: COMMERCIAL

## 2025-07-07 PROCEDURE — RXMED WILLOW AMBULATORY MEDICATION CHARGE

## 2025-07-08 ENCOUNTER — PHARMACY VISIT (OUTPATIENT)
Dept: PHARMACY | Facility: CLINIC | Age: 65
End: 2025-07-08
Payer: COMMERCIAL

## 2025-08-13 PROCEDURE — RXMED WILLOW AMBULATORY MEDICATION CHARGE

## 2025-08-14 ENCOUNTER — PHARMACY VISIT (OUTPATIENT)
Dept: PHARMACY | Facility: CLINIC | Age: 65
End: 2025-08-14
Payer: COMMERCIAL

## 2025-08-30 DIAGNOSIS — N18.4 CHRONIC KIDNEY DISEASE (CKD), STAGE IV (SEVERE) (MULTI): ICD-10-CM

## 2025-10-06 ENCOUNTER — APPOINTMENT (OUTPATIENT)
Dept: PRIMARY CARE | Facility: CLINIC | Age: 65
End: 2025-10-06
Payer: COMMERCIAL